# Patient Record
Sex: MALE | Race: WHITE | NOT HISPANIC OR LATINO | Employment: OTHER | URBAN - METROPOLITAN AREA
[De-identification: names, ages, dates, MRNs, and addresses within clinical notes are randomized per-mention and may not be internally consistent; named-entity substitution may affect disease eponyms.]

---

## 2017-03-28 DIAGNOSIS — E78.2 MIXED HYPERLIPIDEMIA: ICD-10-CM

## 2017-03-28 DIAGNOSIS — I25.10 CORONARY ARTERY STENOSIS: ICD-10-CM

## 2017-03-28 DIAGNOSIS — I21.4 NON-ST ELEVATION MYOCARDIAL INFARCTION (NSTEMI) (HCC): ICD-10-CM

## 2017-03-28 DIAGNOSIS — I25.10 CORONARY ARTERY DISEASE INVOLVING NATIVE CORONARY ARTERY OF NATIVE HEART WITHOUT ANGINA PECTORIS: ICD-10-CM

## 2017-03-28 DIAGNOSIS — N40.0 BENIGN PROSTATIC HYPERPLASIA WITHOUT LOWER URINARY TRACT SYMPTOMS, UNSPECIFIED MORPHOLOGY: ICD-10-CM

## 2017-03-28 RX ORDER — ATORVASTATIN CALCIUM 80 MG/1
TABLET, FILM COATED ORAL
Qty: 90 TAB | Refills: 0 | Status: SHIPPED | OUTPATIENT
Start: 2017-03-28 | End: 2017-06-27 | Stop reason: SDUPTHER

## 2017-03-28 RX ORDER — TAMSULOSIN HYDROCHLORIDE 0.4 MG/1
0.8 CAPSULE ORAL DAILY
Qty: 180 CAP | Refills: 0 | Status: SHIPPED | OUTPATIENT
Start: 2017-03-28 | End: 2017-06-27 | Stop reason: SDUPTHER

## 2017-06-27 ENCOUNTER — HOSPITAL ENCOUNTER (OUTPATIENT)
Dept: LAB | Facility: MEDICAL CENTER | Age: 67
End: 2017-06-27
Attending: FAMILY MEDICINE
Payer: MEDICARE

## 2017-06-27 ENCOUNTER — OFFICE VISIT (OUTPATIENT)
Dept: MEDICAL GROUP | Facility: PHYSICIAN GROUP | Age: 67
End: 2017-06-27
Payer: MEDICARE

## 2017-06-27 VITALS
HEIGHT: 70 IN | RESPIRATION RATE: 14 BRPM | OXYGEN SATURATION: 96 % | TEMPERATURE: 97.5 F | BODY MASS INDEX: 28.06 KG/M2 | WEIGHT: 196 LBS | SYSTOLIC BLOOD PRESSURE: 130 MMHG | HEART RATE: 74 BPM | DIASTOLIC BLOOD PRESSURE: 78 MMHG

## 2017-06-27 DIAGNOSIS — I21.4 NON-ST ELEVATION MYOCARDIAL INFARCTION (NSTEMI) (HCC): ICD-10-CM

## 2017-06-27 DIAGNOSIS — N40.1 BENIGN NON-NODULAR PROSTATIC HYPERPLASIA WITH LOWER URINARY TRACT SYMPTOMS: ICD-10-CM

## 2017-06-27 DIAGNOSIS — E78.2 MIXED HYPERLIPIDEMIA: ICD-10-CM

## 2017-06-27 DIAGNOSIS — I10 ESSENTIAL HYPERTENSION: ICD-10-CM

## 2017-06-27 DIAGNOSIS — Z23 NEED FOR TDAP VACCINATION: ICD-10-CM

## 2017-06-27 DIAGNOSIS — E55.9 VITAMIN D DEFICIENCY DISEASE: ICD-10-CM

## 2017-06-27 DIAGNOSIS — N40.0 BENIGN PROSTATIC HYPERPLASIA WITHOUT LOWER URINARY TRACT SYMPTOMS, UNSPECIFIED MORPHOLOGY: ICD-10-CM

## 2017-06-27 DIAGNOSIS — I25.10 CORONARY ARTERY STENOSIS: ICD-10-CM

## 2017-06-27 DIAGNOSIS — Z12.5 PROSTATE CANCER SCREENING: ICD-10-CM

## 2017-06-27 DIAGNOSIS — Z00.00 HEALTH CARE MAINTENANCE: ICD-10-CM

## 2017-06-27 DIAGNOSIS — N52.9 ERECTILE DYSFUNCTION, UNSPECIFIED ERECTILE DYSFUNCTION TYPE: ICD-10-CM

## 2017-06-27 DIAGNOSIS — I25.10 CORONARY ARTERY DISEASE INVOLVING NATIVE CORONARY ARTERY OF NATIVE HEART WITHOUT ANGINA PECTORIS: ICD-10-CM

## 2017-06-27 LAB
25(OH)D3 SERPL-MCNC: 27 NG/ML (ref 30–100)
ALBUMIN SERPL BCP-MCNC: 4.4 G/DL (ref 3.2–4.9)
ALBUMIN/GLOB SERPL: 1.5 G/DL
ALP SERPL-CCNC: 62 U/L (ref 30–99)
ALT SERPL-CCNC: 64 U/L (ref 2–50)
ANION GAP SERPL CALC-SCNC: 8 MMOL/L (ref 0–11.9)
APPEARANCE UR: CLEAR
AST SERPL-CCNC: 45 U/L (ref 12–45)
BASOPHILS # BLD AUTO: 0.6 % (ref 0–1.8)
BASOPHILS # BLD: 0.03 K/UL (ref 0–0.12)
BILIRUB SERPL-MCNC: 2.8 MG/DL (ref 0.1–1.5)
BILIRUB UR QL STRIP.AUTO: NEGATIVE
BUN SERPL-MCNC: 19 MG/DL (ref 8–22)
CALCIUM SERPL-MCNC: 9.9 MG/DL (ref 8.5–10.5)
CHLORIDE SERPL-SCNC: 107 MMOL/L (ref 96–112)
CHOLEST SERPL-MCNC: 139 MG/DL (ref 100–199)
CO2 SERPL-SCNC: 26 MMOL/L (ref 20–33)
COLOR UR: YELLOW
CREAT SERPL-MCNC: 0.9 MG/DL (ref 0.5–1.4)
CULTURE IF INDICATED INDCX: NO UA CULTURE
EOSINOPHIL # BLD AUTO: 0.15 K/UL (ref 0–0.51)
EOSINOPHIL NFR BLD: 3.1 % (ref 0–6.9)
ERYTHROCYTE [DISTWIDTH] IN BLOOD BY AUTOMATED COUNT: 42.2 FL (ref 35.9–50)
GFR SERPL CREATININE-BSD FRML MDRD: >60 ML/MIN/1.73 M 2
GLOBULIN SER CALC-MCNC: 2.9 G/DL (ref 1.9–3.5)
GLUCOSE SERPL-MCNC: 88 MG/DL (ref 65–99)
GLUCOSE UR STRIP.AUTO-MCNC: NEGATIVE MG/DL
HCT VFR BLD AUTO: 48.1 % (ref 42–52)
HDLC SERPL-MCNC: 71 MG/DL
HGB BLD-MCNC: 16.2 G/DL (ref 14–18)
IMM GRANULOCYTES # BLD AUTO: 0.02 K/UL (ref 0–0.11)
IMM GRANULOCYTES NFR BLD AUTO: 0.4 % (ref 0–0.9)
KETONES UR STRIP.AUTO-MCNC: NEGATIVE MG/DL
LDLC SERPL CALC-MCNC: 55 MG/DL
LEUKOCYTE ESTERASE UR QL STRIP.AUTO: NEGATIVE
LYMPHOCYTES # BLD AUTO: 1.37 K/UL (ref 1–4.8)
LYMPHOCYTES NFR BLD: 28.6 % (ref 22–41)
MCH RBC QN AUTO: 30.4 PG (ref 27–33)
MCHC RBC AUTO-ENTMCNC: 33.7 G/DL (ref 33.7–35.3)
MCV RBC AUTO: 90.2 FL (ref 81.4–97.8)
MICRO URNS: NORMAL
MONOCYTES # BLD AUTO: 0.42 K/UL (ref 0–0.85)
MONOCYTES NFR BLD AUTO: 8.8 % (ref 0–13.4)
NEUTROPHILS # BLD AUTO: 2.8 K/UL (ref 1.82–7.42)
NEUTROPHILS NFR BLD: 58.5 % (ref 44–72)
NITRITE UR QL STRIP.AUTO: NEGATIVE
NRBC # BLD AUTO: 0 K/UL
NRBC BLD AUTO-RTO: 0 /100 WBC
PH UR STRIP.AUTO: 6 [PH]
PLATELET # BLD AUTO: 175 K/UL (ref 164–446)
PMV BLD AUTO: 12.3 FL (ref 9–12.9)
POTASSIUM SERPL-SCNC: 4 MMOL/L (ref 3.6–5.5)
PROT SERPL-MCNC: 7.3 G/DL (ref 6–8.2)
PROT UR QL STRIP: NEGATIVE MG/DL
PSA SERPL-MCNC: 3.36 NG/ML (ref 0–4)
RBC # BLD AUTO: 5.33 M/UL (ref 4.7–6.1)
RBC UR QL AUTO: NEGATIVE
SODIUM SERPL-SCNC: 141 MMOL/L (ref 135–145)
SP GR UR STRIP.AUTO: 1.02
TRIGL SERPL-MCNC: 66 MG/DL (ref 0–149)
WBC # BLD AUTO: 4.8 K/UL (ref 4.8–10.8)

## 2017-06-27 PROCEDURE — 85025 COMPLETE CBC W/AUTO DIFF WBC: CPT

## 2017-06-27 PROCEDURE — 84153 ASSAY OF PSA TOTAL: CPT

## 2017-06-27 PROCEDURE — 81003 URINALYSIS AUTO W/O SCOPE: CPT

## 2017-06-27 PROCEDURE — 36415 COLL VENOUS BLD VENIPUNCTURE: CPT

## 2017-06-27 PROCEDURE — 80061 LIPID PANEL: CPT

## 2017-06-27 PROCEDURE — 99215 OFFICE O/P EST HI 40 MIN: CPT | Performed by: FAMILY MEDICINE

## 2017-06-27 PROCEDURE — 80053 COMPREHEN METABOLIC PANEL: CPT

## 2017-06-27 PROCEDURE — 82306 VITAMIN D 25 HYDROXY: CPT

## 2017-06-27 RX ORDER — TAMSULOSIN HYDROCHLORIDE 0.4 MG/1
0.8 CAPSULE ORAL DAILY
Qty: 180 CAP | Refills: 3 | Status: SHIPPED | OUTPATIENT
Start: 2017-06-27 | End: 2018-06-27 | Stop reason: SDUPTHER

## 2017-06-27 RX ORDER — ATORVASTATIN CALCIUM 80 MG/1
TABLET, FILM COATED ORAL
Qty: 90 TAB | Refills: 3 | Status: SHIPPED | OUTPATIENT
Start: 2017-06-27 | End: 2018-06-27 | Stop reason: SDUPTHER

## 2017-06-27 RX ORDER — SILDENAFIL 100 MG/1
100 TABLET, FILM COATED ORAL PRN
Qty: 16 TAB | Refills: 4 | Status: SHIPPED | OUTPATIENT
Start: 2017-06-27 | End: 2018-06-27 | Stop reason: SDUPTHER

## 2017-06-27 RX ORDER — CLOPIDOGREL BISULFATE 75 MG/1
75 TABLET ORAL
Qty: 90 TAB | Refills: 3 | Status: SHIPPED | OUTPATIENT
Start: 2017-06-27 | End: 2017-08-11

## 2017-06-27 ASSESSMENT — PATIENT HEALTH QUESTIONNAIRE - PHQ9: CLINICAL INTERPRETATION OF PHQ2 SCORE: 0

## 2017-06-27 NOTE — PROGRESS NOTES
Patient comes in with several issues. He is fasting for lab work. He feels well.  He has no chest pains and no shortness of breath. He has have a past history of an MI years ago and I would like to have the cardiologist just check to make sure things are okay.  He is due for colonoscopy but prefers a stoolFIT.  He is due for Tdap.  He needs refills of some of his medications.      Review of Systems   Constitutional: Negative.  Negative for fever, chills, weight loss and malaise/fatigue.   HENT: Negative for hearing loss, ear pain, congestion, sore throat, neck pain and tinnitus.    Eyes: Negative for blurred vision, double vision and pain.   Respiratory: Negative for cough, hemoptysis, shortness of breath and wheezing.    Cardiovascular: Negative for chest pain, palpitations, orthopnea, claudication, leg swelling and PND.   Gastrointestinal: Negative for heartburn, nausea, vomiting, abdominal pain, diarrhea, constipation, blood in stool and melena.   Genitourinary: Negative for incontinence, dysuria, urgency, frequency and hematuria.  Male--positive for erectile dysfunction  Musculoskeletal: Negative for myalgias, back pain and joint pain.   Skin: Negative for rash and itching. No lesions that will not heal.  Neurological: Negative for dizziness, tingling, tremors, focal weakness, seizures, loss of consciousness and headaches.   Endo/Heme/Allergies: Negative for environmental allergies and polydipsia.  Does not bruise/bleed easily.   Psychiatric/Behavioral: Negative for depression, memory loss and substance abuse.  The patient is not nervous/anxious and does not have insomnia.  All others negative.     I reviewed the following    Past Medical History   Diagnosis Date   • Hypertension    • Erectile dysfunction    • Hypertrophy of prostate without urinary obstruction and other lower urinary tract symptoms (LUTS)    • Benign prostatic hypertrophy    • Hyperlipidemia    • Coronary artery stenosis    • Non-ST elevation  myocardial infarction (NSTEMI) (CMS-[facility])         Past Surgical History   Procedure Laterality Date   • Other cardiac surgery  CATH STENT PLACEMENT     Dr Harris  11/2010       Allergies   Allergen Reactions   • Bloodless        Current Outpatient Prescriptions   Medication Sig Dispense Refill   • atorvastatin (LIPITOR) 80 MG tablet TAKE 1 TABLET BY MOUTH AT BEDTIME 90 Tab 3   • tamsulosin (FLOMAX) 0.4 MG capsule Take 2 Caps by mouth every day. 180 Cap 3   • metoprolol (LOPRESSOR) 25 MG Tab Take 1 Tab by mouth every day. 90 Tab 3   • clopidogrel (PLAVIX) 75 MG Tab Take 1 Tab by mouth every day. 90 Tab 3   • aspirin 81 MG tablet Take 1 Tab by mouth every day. 90 Tab 3   • sildenafil citrate (VIAGRA) 100 MG tablet Take 1 Tab by mouth as needed for Erectile Dysfunction. 16 Tab 4   • tetanus-dipth-acell pertussis (ADACEL) 5-2-15.5 LF-MCG/0.5 Suspension 0.5 mL by Intramuscular route Once PRN (Give 1 Vial Tdap IM  Z23). 1 Vial 0   • therapeutic multivitamin-minerals (THERAGRAN-M) TABS Take 1 Tab by mouth every day.     • Cholecalciferol (VITAMIN D3) 1000 UNIT TABS Take 2 Tabs by mouth every day. 100 Tab 3   • Ascorbic Acid (VITAMIN C PO) Take  by mouth.         No current facility-administered medications for this visit.        Family History   Problem Relation Age of Onset   • Hypertension Mother    • Diabetes Father    • Heart Disease Father        Social History     Social History   • Marital Status:      Spouse Name: N/A   • Number of Children: N/A   • Years of Education: N/A     Occupational History   • Not on file.     Social History Main Topics   • Smoking status: Never Smoker    • Smokeless tobacco: Never Used   • Alcohol Use: Yes      Comment: moderate   • Drug Use: No   • Sexual Activity:     Partners: Female     Birth Control/ Protection: Post-Menopausal     Other Topics Concern   • Not on file     Social History Narrative            Physical Exam   Nursing note and vitals reviewed.  Constitutional: He is  oriented. He appears well-developed and well-nourished. He appears not diaphoretic. No distress.   Head: Normocephalic and atraumatic.   Right Ear: External ear normal. Ear canal and TM normal  Left Ear: External ear normal. Ear canal and TM normal  Nose: Nose normal.   Mouth/Throat: Oropharynx is clear and moist. No oropharyngeal exudate.   Eyes: Conjunctivae and extraocular motions are normal. Pupils are equal, round, and reactive to light. Right eye exhibits no discharge. Left eye exhibits no discharge. No scleral icterus. Fundi benign bilaterally   Neck: Normal range of motion. Neck supple. No JVD present. No tracheal deviation present. No thyromegaly present.   Cardiovascular: Normal rate, regular rhythm, normal heart sounds and intact distal pulses.  Exam reveals no gallop and no friction rub.    No murmur heard.  Pulmonary/Chest: Effort normal and breath sounds normal. No stridor. No respiratory distress. He has no wheezes. He has no rales. He exhibits no tenderness.   Abdominal: Soft. Bowel sounds are normal. He exhibits no distension and no mass. No tenderness. No hepatosplenomegaly. He has no rebound and no guarding. Hernia confirmed negative in the right inguinal area and confirmed negative in the left inguinal area.   Genitourinary: Penis normal. Rectal exam shows no external hemorrhoid, no internal hemorrhoid, no fissure, no mass, no tenderness and anal tone normal. Guaiac negative stool. Prostate is 1+ enlarged and not tender. There are no prostate masses. Right testis shows no mass, no swelling, no tenderness. Right testis is descended. Left testis shows no mass, no swelling, no tenderness. Left testis is descended. Circumcised. No phimosis, penile erythema or penile tenderness. No discharge found.   Musculoskeletal: Normal range of motion. He exhibits no edema and no tenderness.   Lymphadenopathy:     He has no cervical adenopathy.   No supraclavicular adenopathy.       Right: No inguinal adenopathy  present.        Left: No inguinal adenopathy present.   Neurological: He is alert and oriented. He displays normal reflexes. No cranial nerve deficit. He exhibits normal muscle tone. Coordination normal.   Skin: Skin is warm and dry. No rash noted. He is not diaphoretic. No erythema. No pallor.   Psychiatric: He has a normal mood and appropriate affect. His behavior is normal. Judgment and thought content normal.      1. Mixed hyperlipidemia --each reassessment  atorvastatin (LIPITOR) 80 MG tablet    aspirin 81 MG tablet    COMP METABOLIC PANEL    LIPID PROFILE    VITAMIN D 25-HYDROXY   2. Coronary artery stenosis--asymptomatic  atorvastatin (LIPITOR) 80 MG tablet    clopidogrel (PLAVIX) 75 MG Tab    aspirin 81 MG tablet    REFERRAL TO CARDIOLOGY--[facility]    3. Non-ST elevation myocardial infarction (NSTEMI) (CMS-[facility]) --past history  atorvastatin (LIPITOR) 80 MG tablet    clopidogrel (PLAVIX) 75 MG Tab    REFERRAL TO CARDIOLOGY--[facility]    4. Vitamin D deficiency disease  VITAMIN D 25-HYDROXY   5. Benign non-nodular prostatic hyperplasia with lower urinary tract symptoms   stable    6. Erectile dysfunction, unspecified erectile dysfunction type  sildenafil citrate (VIAGRA) 100 MG tablet   7. Coronary artery disease involving native coronary artery of native heart without angina pectoris  atorvastatin (LIPITOR) 80 MG tablet    metoprolol (LOPRESSOR) 25 MG Tab    clopidogrel (PLAVIX) 75 MG Tab    REFERRAL TO CARDIOLOGY--[facility]    8. Benign prostatic hyperplasia without lower urinary tract symptoms, unspecified morphology  tamsulosin (FLOMAX) 0.4 MG capsule   9. Essential hypertension  metoprolol (LOPRESSOR) 25 MG Tab    CBC WITH DIFFERENTIAL    COMP METABOLIC PANEL    LIPID PROFILE    URINALYSIS,CULTURE IF INDICATED   10. Prostate cancer screening--controlled  PROSTATE SPECIFIC AG SCREENING   11. Health care maintenance  OCCULT BLOOD FECES IMMUNOASSAY   12. Need for Tdap vaccination  tetanus-dipth-acell pertussis (ADACEL)  5-2-15.5 LF-MCG/0.5 Suspension--given prescription to have this done in an outside pharmacy      I spent 50 minutes with this patient of which 29 minutes was involved in answering his questions about hyperlipidemia, order artery disease history, vitamin D deficiency, BPH, erectile dysfunction, controlled hypertension, colon cancer screening, and Tdap.    Recheck one year or when necessary    Please note that this dictation was created using voice recognition software. I have worked with consultants from the vendor as well as technical experts from [employer] to optimize the interface. I have made every reasonable attempt to correct obvious errors, but I expect that there are errors of grammar and possibly content that I did not discover before finalizing the note.

## 2017-06-27 NOTE — MR AVS SNAPSHOT
"        Jose L Lin   2017 10:20 AM   Office Visit   MRN: [id number]    Department:  [facility]   Dept Phone:  [phone]    Description:  Male : 1950   Provider:  Callum Cruz M.D.           Reason for Visit     Annual Exam           Allergies as of 2017     Allergen Noted Reactions    Bloodless 2010         You were diagnosed with     Mixed hyperlipidemia   [272.2.ICD-9-CM]       Coronary artery stenosis   [764237]       Non-ST elevation myocardial infarction (NSTEMI) (CMS-HCC)   [842481]       Vitamin D deficiency disease   [787880]       Benign non-nodular prostatic hyperplasia with lower urinary tract symptoms   [6242929]       Erectile dysfunction, unspecified erectile dysfunction type   [3550287]       Coronary artery disease involving native coronary artery of native heart without angina pectoris   [9426527]       Benign prostatic hyperplasia without lower urinary tract symptoms, unspecified morphology   [1048586]       Essential hypertension   [2484779]       Prostate cancer screening   [218114]       Health care maintenance   [704434]       Need for Tdap vaccination   [672183]         Vital Signs     Blood Pressure Pulse Temperature Respirations Height Weight    130/78 mmHg 74 36.4 °C (97.5 °F) 14 1.765 m (5' 9.5\") 88.905 kg (196 lb)    Body Mass Index Oxygen Saturation Smoking Status             28.54 kg/m2 96% Never Smoker          Basic Information     Date Of Birth Sex Race Ethnicity Preferred Language    1950 Male White Non- English      Your appointments     Mar 06, 2018 10:40 AM   ANNUAL EXAM PREVENTATIVE with Callum Cruz M.D.   [facility] [address] [address]   [phone]              Problem List              ICD-10-CM Priority Class Noted - Resolved    Hypertension I10   2009 - Present    ED (erectile dysfunction) N52.9   2009 - Present    Decreased hearing H91.90   2010 " - Present    Vitamin D deficiency disease E55.9   4/5/2011 - Present    Hyperlipidemia E78.5   Unknown - Present    Coronary artery stenosis I25.10   Unknown - Present    Non-ST elevation myocardial infarction (NSTEMI) (CMS-HCC) I21.4   Unknown - Present    Benign non-nodular prostatic hyperplasia with lower urinary tract symptoms N40.1   6/27/2017 - Present      Health Maintenance        Date Due Completion Dates    IMM DTaP/Tdap/Td Vaccine (1 - Tdap) 11/6/1969 ---    COLONOSCOPY 11/6/2000 ---    IMM ZOSTER VACCINE 11/6/2010 ---    IMM PNEUMOCOCCAL 65+ (ADULT) LOW/MEDIUM RISK SERIES (1 of 2 - PCV13) 11/6/2015 ---            Current Immunizations     No immunizations on file.      Below and/or attached are the medications your provider expects you to take. Review all of your home medications and newly ordered medications with your provider and/or pharmacist. Follow medication instructions as directed by your provider and/or pharmacist. Please keep your medication list with you and share with your provider. Update the information when medications are discontinued, doses are changed, or new medications (including over-the-counter products) are added; and carry medication information at all times in the event of emergency situations     Allergies:  BLOODLESS - (reactions not documented)               Medications  Valid as of: June 27, 2017 - 11:04 AM    Generic Name Brand Name Tablet Size Instructions for use    Ascorbic Acid   Take  by mouth.          Aspirin (Tab) aspirin 81 MG Take 1 Tab by mouth every day.        Atorvastatin Calcium (Tab) LIPITOR 80 MG TAKE 1 TABLET BY MOUTH AT BEDTIME        Cholecalciferol (Tab) vitamin D3 (cholecalciferol) 1000 UNIT Take 2 Tabs by mouth every day.        Clopidogrel Bisulfate (Tab) PLAVIX 75 MG Take 1 Tab by mouth every day.        Metoprolol Tartrate (Tab) LOPRESSOR 25 MG Take 1 Tab by mouth every day.        Multiple Vitamins-Minerals (Tab) THERAGRAN-M  Take 1 Tab by mouth  every day.        Sildenafil Citrate (Tab) VIAGRA 100 MG Take 1 Tab by mouth as needed for Erectile Dysfunction.        Tamsulosin HCl (Cap) FLOMAX 0.4 MG Take 2 Caps by mouth every day.        Tetanus-Diphth-Acell Pertussis (Suspension) ADACEL 5-2-15.5 LF-MCG/0.5 0.5 mL by Intramuscular route Once PRN (Give 1 Vial Tdap IM  Z23).        .                 Medicines prescribed today were sent to:     [id number] - [place], [place] - [address]    [address] [place] [address]    Phone: [phone] Fax: [phone]    Open 24 Hours?: No    [facility] [place] - [address] AT [address] TO [facility]    [address] [facility] [address]    Phone: [phone] Fax: [phone]    Open 24 Hours?: No      Medication refill instructions:       If your prescription bottle indicates you have medication refills left, it is not necessary to call your provider’s office. Please contact your pharmacy and they will refill your medication.    If your prescription bottle indicates you do not have any refills left, you may request refills at any time through one of the following ways: The online [employer] system (except Urgent Care), by calling your provider’s office, or by asking your pharmacy to contact your provider’s office with a refill request. Medication refills are processed only during regular business hours and may not be available until the next business day. Your provider may request additional information or to have a follow-up visit with you prior to refilling your medication.   *Please Note: Medication refills are assigned a new Rx number when refilled electronically. Your pharmacy may indicate that no refills were authorized even though a new prescription for the same medication is available at the pharmacy. Please request the medicine by name with the pharmacy before contacting your provider for a refill.        Your To Do List     Future Labs/Procedures  Complete By Expires    CBC WITH DIFFERENTIAL  As directed 6/28/2018    COMP METABOLIC PANEL  As directed 6/28/2018    LIPID PROFILE  As directed 6/28/2018    OCCULT BLOOD FECES IMMUNOASSAY  As directed 6/28/2018    PROSTATE SPECIFIC AG SCREENING  As directed 6/28/2018    URINALYSIS,CULTURE IF INDICATED  As directed 6/28/2018      Referral     A referral request has been sent to our patient care coordination department. Please allow 3-5 business days for us to process this request and contact you either by phone or mail. If you do not hear from us by the 5th business day, please call us at [phone].           [employer] Access Code: Activation code not generated  Current Nettle Status: Active

## 2017-06-27 NOTE — PATIENT INSTRUCTIONS
Patient given written instructions regarding labs, imaging, medications, referrals, dietary and lifestyle management, and return visit.    Callum Cruz MD

## 2017-08-11 ENCOUNTER — OFFICE VISIT (OUTPATIENT)
Dept: CARDIOLOGY | Facility: MEDICAL CENTER | Age: 67
End: 2017-08-11
Payer: MEDICARE

## 2017-08-11 VITALS
WEIGHT: 198 LBS | BODY MASS INDEX: 28.35 KG/M2 | OXYGEN SATURATION: 94 % | SYSTOLIC BLOOD PRESSURE: 130 MMHG | HEIGHT: 70 IN | HEART RATE: 66 BPM | DIASTOLIC BLOOD PRESSURE: 80 MMHG

## 2017-08-11 DIAGNOSIS — R01.1 SYSTOLIC MURMUR: ICD-10-CM

## 2017-08-11 DIAGNOSIS — I10 ESSENTIAL HYPERTENSION: ICD-10-CM

## 2017-08-11 DIAGNOSIS — I25.119 CORONARY ARTERY DISEASE WITH ANGINA PECTORIS, UNSPECIFIED VESSEL OR LESION TYPE, UNSPECIFIED WHETHER NATIVE OR TRANSPLANTED HEART (HCC): ICD-10-CM

## 2017-08-11 DIAGNOSIS — I25.10 CORONARY ARTERY DISEASE INVOLVING NATIVE CORONARY ARTERY OF NATIVE HEART WITHOUT ANGINA PECTORIS: ICD-10-CM

## 2017-08-11 PROCEDURE — 99204 OFFICE O/P NEW MOD 45 MIN: CPT | Performed by: INTERNAL MEDICINE

## 2017-08-11 RX ORDER — NITROGLYCERIN 0.4 MG/1
0.4 TABLET SUBLINGUAL PRN
Qty: 25 TAB | Refills: 11 | Status: SHIPPED | OUTPATIENT
Start: 2017-08-11 | End: 2018-06-27 | Stop reason: SDUPTHER

## 2017-08-11 ASSESSMENT — ENCOUNTER SYMPTOMS
ABDOMINAL PAIN: 0
DEPRESSION: 0
DIZZINESS: 0
BRUISES/BLEEDS EASILY: 0
SHORTNESS OF BREATH: 0
ORTHOPNEA: 0
PND: 0
PALPITATIONS: 0
LOSS OF CONSCIOUSNESS: 0
FALLS: 0

## 2017-08-11 NOTE — Clinical Note
[facility]   [address] [place] [address]  [place] [address]  Phone: [phone]  Fax: [phone]              Jose L Lin  1950    Encounter Date: 8/11/2017    Lynda Malagon M.D.          PROGRESS NOTE:  Subjective:   Jose L Lin is a 66 y.o. male with past history significant for hypertension, hyperlipidemia, coronary artery disease status post-PCI to the circumflex in 2010 who was referred here for further management. Patient has been doing well overall since his PCI. He only reports one episode of chest discomfort while he was lifting heavy objects as he was remodeling his house. He denies any dyspnea. No palpitations or dizziness. He walks 3 times a week for about 2 miles in about 30 minutes without any symptoms.    He has been compliant with all of his medications. He is only on metoprolol 25 mg once a day as has been prescribed. He is on both aspirin and Plavix and has has not had any bleeding issues.   He is compliant with his Lipitor. His LDL has been well controlled over the years.    He drinks 2-3 drinks per day, red wine or vodka.    His last echocardiogram was in 2010, unfortunately I cannot find that report.  During the cardiac catheterization he was found to have an occluded left circumflex. He did not have any other significant coronary artery disease.    Past Medical History   Diagnosis Date   • Hypertension    • Erectile dysfunction    • Hypertrophy of prostate without urinary obstruction and other lower urinary tract symptoms (LUTS)    • Benign prostatic hypertrophy    • Hyperlipidemia    • Coronary artery stenosis    • Non-ST elevation myocardial infarction (NSTEMI) (CMS-[facility])      Past Surgical History   Procedure Laterality Date   • Other cardiac surgery  CATH STENT PLACEMENT     Dr Harris  11/2010     Family History   Problem Relation Age of Onset   • Hypertension Mother    • Diabetes Father    • Heart Disease Father      History   Smoking  status   • Never Smoker    Smokeless tobacco   • Never Used     Drinks 2-3 drinks per day. No recreational drug use.    Allergies   Allergen Reactions   • Bloodless      Outpatient Encounter Prescriptions as of 8/11/2017   Medication Sig Dispense Refill   • metoprolol (LOPRESSOR) 25 MG Tab Take 1 Tab by mouth 2 times a day. 180 Tab 3   • nitroglycerin (NITROSTAT) 0.4 MG SL Tab Place 1 Tab under tongue as needed for Chest Pain. 25 Tab 11   • atorvastatin (LIPITOR) 80 MG tablet TAKE 1 TABLET BY MOUTH AT BEDTIME 90 Tab 3   • tamsulosin (FLOMAX) 0.4 MG capsule Take 2 Caps by mouth every day. 180 Cap 3   • aspirin 81 MG tablet Take 1 Tab by mouth every day. 90 Tab 3   • therapeutic multivitamin-minerals (THERAGRAN-M) TABS Take 1 Tab by mouth every day.     • Cholecalciferol (VITAMIN D3) 1000 UNIT TABS Take 2 Tabs by mouth every day. 100 Tab 3   • Ascorbic Acid (VITAMIN C PO) Take  by mouth.       • sildenafil citrate (VIAGRA) 100 MG tablet Take 1 Tab by mouth as needed for Erectile Dysfunction. 16 Tab 4   • tetanus-dipth-acell pertussis (ADACEL) 5-2-15.5 LF-MCG/0.5 Suspension 0.5 mL by Intramuscular route Once PRN (Give 1 Vial Tdap IM  Z23). 1 Vial 0   • [DISCONTINUED] metoprolol (LOPRESSOR) 25 MG Tab Take 1 Tab by mouth every day. 90 Tab 3   • [DISCONTINUED] clopidogrel (PLAVIX) 75 MG Tab Take 1 Tab by mouth every day. 90 Tab 3     No facility-administered encounter medications on file as of 8/11/2017.     Review of Systems   Constitutional: Negative for malaise/fatigue.   HENT: Negative.    Respiratory: Negative for shortness of breath.    Cardiovascular: Negative for chest pain, palpitations, orthopnea, leg swelling and PND.   Gastrointestinal: Negative for abdominal pain.   Musculoskeletal: Negative for falls.   Skin: Negative.    Neurological: Negative for dizziness and loss of consciousness.   Endo/Heme/Allergies: Does not bruise/bleed easily.   Psychiatric/Behavioral: Negative for depression.   All other systems  "reviewed and are negative.       Objective:   /80 mmHg  Pulse 66  Ht 1.765 m (5' 9.5\")  Wt 89.812 kg (198 lb)  BMI 28.83 kg/m2  SpO2 94%    Physical Exam   Constitutional: He is oriented to person, place, and time. No distress.   HENT:   Head: Normocephalic and atraumatic.   Eyes: Conjunctivae are normal.   Neck: Normal range of motion. Neck supple. No JVD present.   Cardiovascular: Normal rate and regular rhythm.  Exam reveals no gallop and no friction rub.    Murmur heard.   Crescendo decrescendo systolic murmur is present with a grade of 2/6   Pulmonary/Chest: Effort normal and breath sounds normal. No respiratory distress. He has no wheezes. He has no rales.   Abdominal: Soft. There is no tenderness.   Musculoskeletal: He exhibits no edema.   Neurological: He is alert and oriented to person, place, and time.   Skin: Skin is warm and dry. He is not diaphoretic.   Psychiatric: He has a normal mood and affect.   Nursing note and vitals reviewed.    Labs from June 2017 were reviewed. Normal hemoglobin. Normal creatinine. LDL 55.    ECG from today was reviewed and shows normal sinus rhythm at 61 bpm, voltage criteria for left ventricular hypertrophy, incomplete right bundle branch block.    Assessment:     1. Coronary artery disease with angina pectoris, unspecified vessel or lesion type, unspecified whether native or transplanted heart (CMS-[facility])  EKG    nitroglycerin (NITROSTAT) 0.4 MG SL Tab   2. Coronary artery disease involving native coronary artery of native heart without angina pectoris  metoprolol (LOPRESSOR) 25 MG Tab   3. Essential hypertension  metoprolol (LOPRESSOR) 25 MG Tab   4. Systolic murmur  ECHOCARDIOGRAM COMP W/O CONT       Medical Decision Making:  Today's Assessment / Status / Plan:     Coronary artery disease-patient's last PCI was about 7 years ago. Only one episode of angina many months ago while lifting heavy objects. Since then he has been asymptomatic. For now continue aspirin. " No clear indication for use of Plavix at this time as patient is 7 years out from his PCI. Will discontinue Plavix at this time. I expect to the patient that metoprolol 25 mg once a day is not sufficient and so I will increase the metoprolol to 25 mg twice a day. Continue Lipitor.  I advised the patient to continue aerobic exercise and increase the intensity and frequency of exercise as tolerated.  I have also prescribed him nitroglycerin prn. The patient knows to not take his PDE-5 inhibitors within 48 hours of the nitroglycerin.    Systolic murmur-likely aortic sclerosis versus mild stenosis. I will refer him for an echocardiogram for further evaluation.    Hypertension-blood pressure is at goal. Metoprolol changes noted above.    Hyperlipidemia-LDL 55. No clear indication for high dose of Lipitor, however patient would like to continue the current dose as he is worried about having a recurrent MI.    Alcohol use-I advised the patient to limit his alcoholic beverage intake to ideally one drink per day, no more than 2 per day.    Return to clinic in 6 months or earlier if needed.    Thank you for allowing me to participate in the care of this patient. Please do not hesitate to contact me with any questions.    Lynda Malagon MD  Cardiologist  [facility]      PLEASE NOTE: This dictation was created using voice recognition software.         Callum Cruz M.D.  [address] [place] [address]  VIA In Basket

## 2017-08-11 NOTE — MR AVS SNAPSHOT
"        Jose L Lin   2017 2:00 PM   Office Visit   MRN: [id number]    Department:  [facility]   Dept Phone:  [phone]    Description:  Male : 1950   Provider:  Lynda Malagon M.D.           Reason for Visit     New Patient           Allergies as of 2017     Allergen Noted Reactions    Bloodless 2010         You were diagnosed with     Coronary artery disease with angina pectoris, unspecified vessel or lesion type, unspecified whether native or transplanted heart (CMS-[facility])   [0437512]       Coronary artery disease involving native coronary artery of native heart without angina pectoris   [2431945]       Essential hypertension   [6610284]       Systolic murmur   [846962]         Vital Signs     Blood Pressure Pulse Height Weight Body Mass Index Oxygen Saturation    130/80 mmHg 66 1.765 m (5' 9.5\") 89.812 kg (198 lb) 28.83 kg/m2 94%    Smoking Status                   Never Smoker            Basic Information     Date Of Birth Sex Race Ethnicity Preferred Language    1950 Male White Non- English      Your appointments     Aug 25, 2017  2:15 PM   ECHO with ECHO [facility] EXAM 9   ECHOCARDIOLOGY [facility] ([facility])    [address] [place] [address]   [phone]            2018  2:00 PM   FOLLOW UP with Lynda Malagon M.D.   [facility] (--)    [address], [facility] [facility]  [place] [address]   [phone]            Mar 06, 2018 10:40 AM   ANNUAL EXAM PREVENTATIVE with Callum Cruz M.D.   [facility] ([place])    [address] [address] [phone]              Problem List              ICD-10-CM Priority Class Noted - Resolved    Hypertension I10   2009 - Present    ED (erectile dysfunction) N52.9   2009 - Present    Decreased hearing H91.90   2010 - Present    Vitamin D deficiency disease E55.9   2011 - Present    Hyperlipidemia E78.5   Unknown - Present    Coronary artery " stenosis I25.10   Unknown - Present    Non-ST elevation myocardial infarction (NSTEMI) (CMS-HCC) I21.4   Unknown - Present    Benign non-nodular prostatic hyperplasia with lower urinary tract symptoms N40.1   6/27/2017 - Present      Health Maintenance        Date Due Completion Dates    IMM DTaP/Tdap/Td Vaccine (1 - Tdap) 11/6/1969 ---    COLONOSCOPY 11/6/2000 ---    IMM ZOSTER VACCINE 11/6/2010 ---    IMM PNEUMOCOCCAL 65+ (ADULT) LOW/MEDIUM RISK SERIES (1 of 2 - PCV13) 11/6/2015 ---    IMM INFLUENZA (1) 9/1/2017 ---            Results       Current Immunizations     No immunizations on file.      Below and/or attached are the medications your provider expects you to take. Review all of your home medications and newly ordered medications with your provider and/or pharmacist. Follow medication instructions as directed by your provider and/or pharmacist. Please keep your medication list with you and share with your provider. Update the information when medications are discontinued, doses are changed, or new medications (including over-the-counter products) are added; and carry medication information at all times in the event of emergency situations     Allergies:  BLOODLESS - (reactions not documented)               Medications  Valid as of: August 11, 2017 -  2:35 PM    Generic Name Brand Name Tablet Size Instructions for use    Ascorbic Acid   Take  by mouth.          Aspirin (Tab) aspirin 81 MG Take 1 Tab by mouth every day.        Atorvastatin Calcium (Tab) LIPITOR 80 MG TAKE 1 TABLET BY MOUTH AT BEDTIME        Cholecalciferol (Tab) vitamin D3 (cholecalciferol) 1000 UNIT Take 2 Tabs by mouth every day.        Metoprolol Tartrate (Tab) LOPRESSOR 25 MG Take 1 Tab by mouth 2 times a day.        Multiple Vitamins-Minerals (Tab) THERAGRAN-M  Take 1 Tab by mouth every day.        Nitroglycerin (SL Tab) NITROSTAT 0.4 MG Place 1 Tab under tongue as needed for Chest Pain.        Sildenafil Citrate (Tab) VIAGRA 100 MG Take  1 Tab by mouth as needed for Erectile Dysfunction.        Tamsulosin HCl (Cap) FLOMAX 0.4 MG Take 2 Caps by mouth every day.        Tetanus-Diphth-Acell Pertussis (Suspension) ADACEL 5-2-15.5 LF-MCG/0.5 0.5 mL by Intramuscular route Once PRN (Give 1 Vial Tdap IM  Z23).        .                 Medicines prescribed today were sent to:     [id number] - [place], [place] - [address]    [address] [place] [address]    Phone: [phone] Fax: [phone]    Open 24 Hours?: No    [facility] - [place], [place] - [address] AT [address] TO [facility]    [address] [facility] [address]    Phone: [phone] Fax: [phone]    Open 24 Hours?: No      Medication refill instructions:       If your prescription bottle indicates you have medication refills left, it is not necessary to call your provider’s office. Please contact your pharmacy and they will refill your medication.    If your prescription bottle indicates you do not have any refills left, you may request refills at any time through one of the following ways: The online [employer] system (except Urgent Care), by calling your provider’s office, or by asking your pharmacy to contact your provider’s office with a refill request. Medication refills are processed only during regular business hours and may not be available until the next business day. Your provider may request additional information or to have a follow-up visit with you prior to refilling your medication.   *Please Note: Medication refills are assigned a new Rx number when refilled electronically. Your pharmacy may indicate that no refills were authorized even though a new prescription for the same medication is available at the pharmacy. Please request the medicine by name with the pharmacy before contacting your provider for a refill.        Your To Do List     Future Labs/Procedures Complete By Expires    ECHOCARDIOGRAM COMP W/O CONT  As directed  8/11/2018      Instructions    Please stop taking plavix  Please take metoprolol 25mg twice a day.           [employer] Access Code: Activation code not generated  Current [employer] Status: Active

## 2017-08-11 NOTE — PROGRESS NOTES
Subjective:   Jose L Lin is a 66 y.o. male with past history significant for hypertension, hyperlipidemia, coronary artery disease status post-PCI to the circumflex in 2010 who was referred here for further management. Patient has been doing well overall since his PCI. He only reports one episode of chest discomfort while he was lifting heavy objects as he was remodeling his house. He denies any dyspnea. No palpitations or dizziness. He walks 3 times a week for about 2 miles in about 30 minutes without any symptoms.    He has been compliant with all of his medications. He is only on metoprolol 25 mg once a day as has been prescribed. He is on both aspirin and Plavix and has has not had any bleeding issues.   He is compliant with his Lipitor. His LDL has been well controlled over the years.    He drinks 2-3 drinks per day, red wine or vodka.    His last echocardiogram was in 2010, unfortunately I cannot find that report.  During the cardiac catheterization he was found to have an occluded left circumflex. He did not have any other significant coronary artery disease.    Past Medical History   Diagnosis Date   • Hypertension    • Erectile dysfunction    • Hypertrophy of prostate without urinary obstruction and other lower urinary tract symptoms (LUTS)    • Benign prostatic hypertrophy    • Hyperlipidemia    • Coronary artery stenosis    • Non-ST elevation myocardial infarction (NSTEMI) (CMS-[facility])      Past Surgical History   Procedure Laterality Date   • Other cardiac surgery  CATH STENT PLACEMENT     Dr Harris  11/2010     Family History   Problem Relation Age of Onset   • Hypertension Mother    • Diabetes Father    • Heart Disease Father      History   Smoking status   • Never Smoker    Smokeless tobacco   • Never Used     Drinks 2-3 drinks per day. No recreational drug use.    Allergies   Allergen Reactions   • Bloodless      Outpatient Encounter Prescriptions as of 8/11/2017   Medication Sig Dispense Refill   •  "metoprolol (LOPRESSOR) 25 MG Tab Take 1 Tab by mouth 2 times a day. 180 Tab 3   • nitroglycerin (NITROSTAT) 0.4 MG SL Tab Place 1 Tab under tongue as needed for Chest Pain. 25 Tab 11   • atorvastatin (LIPITOR) 80 MG tablet TAKE 1 TABLET BY MOUTH AT BEDTIME 90 Tab 3   • tamsulosin (FLOMAX) 0.4 MG capsule Take 2 Caps by mouth every day. 180 Cap 3   • aspirin 81 MG tablet Take 1 Tab by mouth every day. 90 Tab 3   • therapeutic multivitamin-minerals (THERAGRAN-M) TABS Take 1 Tab by mouth every day.     • Cholecalciferol (VITAMIN D3) 1000 UNIT TABS Take 2 Tabs by mouth every day. 100 Tab 3   • Ascorbic Acid (VITAMIN C PO) Take  by mouth.       • sildenafil citrate (VIAGRA) 100 MG tablet Take 1 Tab by mouth as needed for Erectile Dysfunction. 16 Tab 4   • tetanus-dipth-acell pertussis (ADACEL) 5-2-15.5 LF-MCG/0.5 Suspension 0.5 mL by Intramuscular route Once PRN (Give 1 Vial Tdap IM  Z23). 1 Vial 0   • [DISCONTINUED] metoprolol (LOPRESSOR) 25 MG Tab Take 1 Tab by mouth every day. 90 Tab 3   • [DISCONTINUED] clopidogrel (PLAVIX) 75 MG Tab Take 1 Tab by mouth every day. 90 Tab 3     No facility-administered encounter medications on file as of 8/11/2017.     Review of Systems   Constitutional: Negative for malaise/fatigue.   HENT: Negative.    Respiratory: Negative for shortness of breath.    Cardiovascular: Negative for chest pain, palpitations, orthopnea, leg swelling and PND.   Gastrointestinal: Negative for abdominal pain.   Musculoskeletal: Negative for falls.   Skin: Negative.    Neurological: Negative for dizziness and loss of consciousness.   Endo/Heme/Allergies: Does not bruise/bleed easily.   Psychiatric/Behavioral: Negative for depression.   All other systems reviewed and are negative.       Objective:   /80 mmHg  Pulse 66  Ht 1.765 m (5' 9.5\")  Wt 89.812 kg (198 lb)  BMI 28.83 kg/m2  SpO2 94%    Physical Exam   Constitutional: He is oriented to person, place, and time. No distress.   HENT:   Head: " Normocephalic and atraumatic.   Eyes: Conjunctivae are normal.   Neck: Normal range of motion. Neck supple. No JVD present.   Cardiovascular: Normal rate and regular rhythm.  Exam reveals no gallop and no friction rub.    Murmur heard.   Crescendo decrescendo systolic murmur is present with a grade of 2/6   Pulmonary/Chest: Effort normal and breath sounds normal. No respiratory distress. He has no wheezes. He has no rales.   Abdominal: Soft. There is no tenderness.   Musculoskeletal: He exhibits no edema.   Neurological: He is alert and oriented to person, place, and time.   Skin: Skin is warm and dry. He is not diaphoretic.   Psychiatric: He has a normal mood and affect.   Nursing note and vitals reviewed.    Labs from June 2017 were reviewed. Normal hemoglobin. Normal creatinine. LDL 55.    ECG from today was reviewed and shows normal sinus rhythm at 61 bpm, voltage criteria for left ventricular hypertrophy, incomplete right bundle branch block.    Assessment:     1. Coronary artery disease with angina pectoris, unspecified vessel or lesion type, unspecified whether native or transplanted heart (CMS-[facility])  EKG    nitroglycerin (NITROSTAT) 0.4 MG SL Tab   2. Coronary artery disease involving native coronary artery of native heart without angina pectoris  metoprolol (LOPRESSOR) 25 MG Tab   3. Essential hypertension  metoprolol (LOPRESSOR) 25 MG Tab   4. Systolic murmur  ECHOCARDIOGRAM COMP W/O CONT       Medical Decision Making:  Today's Assessment / Status / Plan:     Coronary artery disease-patient's last PCI was about 7 years ago. Only one episode of angina many months ago while lifting heavy objects. Since then he has been asymptomatic. For now continue aspirin. No clear indication for use of Plavix at this time as patient is 7 years out from his PCI. Will discontinue Plavix at this time. I expect to the patient that metoprolol 25 mg once a day is not sufficient and so I will increase the metoprolol to 25 mg  twice a day. Continue Lipitor.  I advised the patient to continue aerobic exercise and increase the intensity and frequency of exercise as tolerated.  I have also prescribed him nitroglycerin prn. The patient knows to not take his PDE-5 inhibitors within 48 hours of the nitroglycerin.    Systolic murmur-likely aortic sclerosis versus mild stenosis. I will refer him for an echocardiogram for further evaluation.    Hypertension-blood pressure is at goal. Metoprolol changes noted above.    Hyperlipidemia-LDL 55. No clear indication for high dose of Lipitor, however patient would like to continue the current dose as he is worried about having a recurrent MI.    Alcohol use-I advised the patient to limit his alcoholic beverage intake to ideally one drink per day, no more than 2 per day.    Return to clinic in 6 months or earlier if needed.    Thank you for allowing me to participate in the care of this patient. Please do not hesitate to contact me with any questions.    Lynda Malagon MD  Cardiologist  [facility]      PLEASE NOTE: This dictation was created using voice recognition software.

## 2017-08-14 LAB — EKG IMPRESSION: NORMAL

## 2017-08-25 ENCOUNTER — APPOINTMENT (OUTPATIENT)
Dept: CARDIOLOGY | Facility: MEDICAL CENTER | Age: 67
End: 2017-08-25
Attending: INTERNAL MEDICINE
Payer: MEDICARE

## 2017-09-11 ENCOUNTER — HOSPITAL ENCOUNTER (OUTPATIENT)
Dept: CARDIOLOGY | Facility: MEDICAL CENTER | Age: 67
End: 2017-09-11
Attending: INTERNAL MEDICINE
Payer: MEDICARE

## 2017-09-11 DIAGNOSIS — R01.1 SYSTOLIC MURMUR: ICD-10-CM

## 2017-09-11 PROCEDURE — 93306 TTE W/DOPPLER COMPLETE: CPT

## 2017-09-11 PROCEDURE — 93306 TTE W/DOPPLER COMPLETE: CPT | Mod: 26 | Performed by: INTERNAL MEDICINE

## 2017-09-13 LAB
LV EJECT FRACT  99904: 55
LV EJECT FRACT MOD 2C 99903: 36.79
LV EJECT FRACT MOD 4C 99902: 45.96
LV EJECT FRACT MOD BP 99901: 42.78

## 2017-09-15 ENCOUNTER — TELEPHONE (OUTPATIENT)
Dept: CARDIOLOGY | Facility: MEDICAL CENTER | Age: 67
End: 2017-09-15

## 2017-09-15 NOTE — TELEPHONE ENCOUNTER
----- Message from Marlen Madsen R.N. sent at 9/15/2017  2:56 PM PDT -----      ----- Message -----  From: Lynda Malagon M.D.  Sent: 9/15/2017   2:44 PM  To: Melanie Camarena R.N.    Reviewed echocardiogram. Looks good. Murmur secondary to aortic sclerosis. Nothing to worry about.  No change in management at this time.   Thank you   AA

## 2017-09-18 NOTE — TELEPHONE ENCOUNTER
S/w pt about Echo results. He denies questions at this time and is appreciative of call and will FU with AA 1/30.

## 2018-03-06 ENCOUNTER — APPOINTMENT (OUTPATIENT)
Dept: MEDICAL GROUP | Facility: PHYSICIAN GROUP | Age: 68
End: 2018-03-06
Payer: MEDICARE

## 2018-06-27 ENCOUNTER — OFFICE VISIT (OUTPATIENT)
Dept: MEDICAL GROUP | Facility: PHYSICIAN GROUP | Age: 68
End: 2018-06-27
Payer: MEDICARE

## 2018-06-27 VITALS
SYSTOLIC BLOOD PRESSURE: 120 MMHG | BODY MASS INDEX: 28.2 KG/M2 | HEIGHT: 70 IN | DIASTOLIC BLOOD PRESSURE: 68 MMHG | OXYGEN SATURATION: 95 % | HEART RATE: 68 BPM | RESPIRATION RATE: 14 BRPM | WEIGHT: 197 LBS | TEMPERATURE: 97.5 F

## 2018-06-27 DIAGNOSIS — H91.93 DECREASED HEARING OF BOTH EARS: ICD-10-CM

## 2018-06-27 DIAGNOSIS — Z12.5 PROSTATE CANCER SCREENING: ICD-10-CM

## 2018-06-27 DIAGNOSIS — I25.119 CORONARY ARTERY DISEASE WITH ANGINA PECTORIS, UNSPECIFIED VESSEL OR LESION TYPE, UNSPECIFIED WHETHER NATIVE OR TRANSPLANTED HEART (HCC): ICD-10-CM

## 2018-06-27 DIAGNOSIS — N52.9 ERECTILE DYSFUNCTION, UNSPECIFIED ERECTILE DYSFUNCTION TYPE: ICD-10-CM

## 2018-06-27 DIAGNOSIS — N40.1 BENIGN NON-NODULAR PROSTATIC HYPERPLASIA WITH LOWER URINARY TRACT SYMPTOMS: ICD-10-CM

## 2018-06-27 DIAGNOSIS — I25.10 CORONARY ARTERY STENOSIS: ICD-10-CM

## 2018-06-27 DIAGNOSIS — I21.4 NON-ST ELEVATION MYOCARDIAL INFARCTION (NSTEMI) (HCC): ICD-10-CM

## 2018-06-27 DIAGNOSIS — I10 ESSENTIAL HYPERTENSION: ICD-10-CM

## 2018-06-27 DIAGNOSIS — E55.9 VITAMIN D DEFICIENCY DISEASE: ICD-10-CM

## 2018-06-27 DIAGNOSIS — I35.8 AORTIC VALVE SCLEROSIS: ICD-10-CM

## 2018-06-27 DIAGNOSIS — I25.10 CORONARY ARTERY DISEASE INVOLVING NATIVE CORONARY ARTERY OF NATIVE HEART WITHOUT ANGINA PECTORIS: ICD-10-CM

## 2018-06-27 DIAGNOSIS — Z12.11 COLON CANCER SCREENING: ICD-10-CM

## 2018-06-27 DIAGNOSIS — E78.2 MIXED HYPERLIPIDEMIA: ICD-10-CM

## 2018-06-27 PROCEDURE — 99214 OFFICE O/P EST MOD 30 MIN: CPT | Performed by: FAMILY MEDICINE

## 2018-06-27 RX ORDER — ATORVASTATIN CALCIUM 80 MG/1
TABLET, FILM COATED ORAL
Qty: 90 TAB | Refills: 3 | Status: SHIPPED | OUTPATIENT
Start: 2018-06-27 | End: 2019-07-09 | Stop reason: SDUPTHER

## 2018-06-27 RX ORDER — SILDENAFIL 100 MG/1
100 TABLET, FILM COATED ORAL PRN
Qty: 16 TAB | Refills: 4 | Status: SHIPPED | OUTPATIENT
Start: 2018-06-27 | End: 2019-11-12 | Stop reason: SDUPTHER

## 2018-06-27 RX ORDER — NITROGLYCERIN 0.4 MG/1
0.4 TABLET SUBLINGUAL PRN
Qty: 25 TAB | Refills: 11 | Status: SHIPPED | OUTPATIENT
Start: 2018-06-27 | End: 2019-11-12 | Stop reason: SDUPTHER

## 2018-06-27 RX ORDER — TAMSULOSIN HYDROCHLORIDE 0.4 MG/1
0.8 CAPSULE ORAL DAILY
Qty: 180 CAP | Refills: 3 | Status: SHIPPED | OUTPATIENT
Start: 2018-06-27 | End: 2019-07-09 | Stop reason: SDUPTHER

## 2018-06-27 ASSESSMENT — PATIENT HEALTH QUESTIONNAIRE - PHQ9: CLINICAL INTERPRETATION OF PHQ2 SCORE: 0

## 2018-06-28 NOTE — PROGRESS NOTES
Patient comes in with several issues.  He is due for lab work.  He has no chest pains and no shortness of breath.  He is retired now and is happy with that.  He needs refills of some of his medications.      Review of Systems   Constitutional: Negative.  Negative for fever, chills, weight loss and malaise/fatigue.   HENT: Negative for hearing loss, ear pain, congestion, sore throat, neck pain and tinnitus.    Eyes: Negative for blurred vision, double vision and pain.   Respiratory: Negative for cough, hemoptysis, shortness of breath and wheezing.    Cardiovascular: Negative for chest pain, palpitations, orthopnea, claudication, leg swelling and PND.   Gastrointestinal: Negative for heartburn, nausea, vomiting, abdominal pain, diarrhea, constipation, blood in stool and melena.   Genitourinary: Negative for incontinence, dysuria, urgency, frequency and hematuria.  Male--positive for erectile dysfunction  Musculoskeletal: Negative for myalgias, back pain and joint pain.   Skin: Negative for rash and itching. No lesions that will not heal.  Neurological: Negative for dizziness, tingling, tremors, focal weakness, seizures, loss of consciousness and headaches.   Endo/Heme/Allergies: Negative for environmental allergies and polydipsia.  Does not bruise/bleed easily.   Psychiatric/Behavioral: Negative for depression, memory loss and substance abuse.  The patient is not nervous/anxious and does not have insomnia.  All others negative.    I reviewed the following    Past Medical History:   Diagnosis Date   • Benign prostatic hypertrophy    • Coronary artery stenosis    • Erectile dysfunction    • Hyperlipidemia    • Hypertension    • Hypertrophy of prostate without urinary obstruction and other lower urinary tract symptoms (LUTS)    • Non-ST elevation myocardial infarction (NSTEMI) ([facility])         Past Surgical History:   Procedure Laterality Date   • OTHER CARDIAC SURGERY  CATH STENT PLACEMENT    Dr Harris  11/2010        Allergies   Allergen Reactions   • Bloodless        Current Outpatient Prescriptions   Medication Sig Dispense Refill   • metoprolol (LOPRESSOR) 25 MG Tab Take 1 Tab by mouth 2 times a day. 180 Tab 3   • atorvastatin (LIPITOR) 80 MG tablet TAKE 1 TABLET BY MOUTH AT BEDTIME 90 Tab 3   • tamsulosin (FLOMAX) 0.4 MG capsule Take 2 Caps by mouth every day. 180 Cap 3   • nitroglycerin (NITROSTAT) 0.4 MG SL Tab Place 1 Tab under tongue as needed for Chest Pain. 25 Tab 11   • sildenafil citrate (VIAGRA) 100 MG tablet Take 1 Tab by mouth as needed for Erectile Dysfunction. 16 Tab 4   • aspirin 81 MG tablet Take 1 Tab by mouth every day. 90 Tab 3   • therapeutic multivitamin-minerals (THERAGRAN-M) TABS Take 1 Tab by mouth every day.     • Cholecalciferol (VITAMIN D3) 1000 UNIT TABS Take 2 Tabs by mouth every day. 100 Tab 3   • tetanus-dipth-acell pertussis (ADACEL) 5-2-15.5 LF-MCG/0.5 Suspension 0.5 mL by Intramuscular route Once PRN (Give 1 Vial Tdap IM  Z23). 1 Vial 0   • Ascorbic Acid (VITAMIN C PO) Take  by mouth.         No current facility-administered medications for this visit.         Family History   Problem Relation Age of Onset   • Hypertension Mother    • Diabetes Father    • Heart Disease Father        Social History     Social History   • Marital status:      Spouse name: N/A   • Number of children: N/A   • Years of education: N/A     Occupational History   • Not on file.     Social History Main Topics   • Smoking status: Never Smoker   • Smokeless tobacco: Never Used   • Alcohol use Yes      Comment: moderate   • Drug use: No   • Sexual activity: Yes     Partners: Female     Birth control/ protection: Post-Menopausal     Other Topics Concern   • Not on file     Social History Narrative   • No narrative on file        Physical Exam   Nursing note and vitals reviewed.  Constitutional: He is oriented. He appears well-developed and well-nourished. He appears not diaphoretic. No distress.   Head:  Normocephalic and atraumatic.   Right Ear: External ear normal. Ear canal and TM normal  Left Ear: External ear normal. Ear canal and TM normal  Nose: Nose normal.   Mouth/Throat: Oropharynx is clear and moist. No oropharyngeal exudate.   Eyes: Conjunctivae and extraocular motions are normal. Pupils are equal, round, and reactive to light. Right eye exhibits no discharge. Left eye exhibits no discharge. No scleral icterus. Fundi benign bilaterally   Neck: Normal range of motion. Neck supple. No JVD present. No tracheal deviation present. No thyromegaly present.   Cardiovascular: Normal rate, regular rhythm, normal heart sounds and intact distal pulses.  Exam reveals no gallop and no friction rub.    No murmur heard.  Pulmonary/Chest: Effort normal and breath sounds normal. No stridor. No respiratory distress. He has no wheezes. He has no rales. He exhibits no tenderness.   Abdominal: Soft. Bowel sounds are normal. He exhibits no distension and no mass. No tenderness. No hepatosplenomegaly. He has no rebound and no guarding. Hernia confirmed negative in the right inguinal area and confirmed negative in the left inguinal area.   Genitourinary: Penis normal. Rectal exam shows no external hemorrhoid, no internal hemorrhoid, no fissure, no mass, no tenderness and anal tone normal. Guaiac negative stool. Prostate is 2+ enlarged and not tender. There are no prostate masses. Right testis shows no mass, no swelling, no tenderness. Right testis is descended. Left testis shows no mass, no swelling, no tenderness. Left testis is descended. Circumcised. No phimosis, penile erythema or penile tenderness. No discharge found.   Musculoskeletal: Normal range of motion. He exhibits no edema and no tenderness.   Lymphadenopathy:     He has no cervical adenopathy.   No supraclavicular adenopathy.       Right: No inguinal adenopathy present.        Left: No inguinal adenopathy present.   Neurological: He is alert and oriented. He  displays normal reflexes. No cranial nerve deficit. He exhibits normal muscle tone. Coordination normal.   Skin: Skin is warm and dry. No rash noted. He is not diaphoretic. No erythema. No pallor.   Psychiatric: He has a normal mood and appropriate affect. His behavior is normal. Judgment and thought content normal.    1. Aortic valve sclerosis  REFERRAL TO CARDIOLOGY--Dr. Malagon whom the patient likes very much    Echocardiogram 9/2017   2. Essential hypertension --controlled CBC WITH DIFFERENTIAL    COMP METABOLIC PANEL    LIPID PROFILE    URINALYSIS,CULTURE IF INDICATED    metoprolol (LOPRESSOR) 25 MG Tab   3. Decreased hearing of both ears   stable   4. Vitamin D deficiency disease --needs reassessment VITAMIN D 25-HYDROXY   5. Mixed hyperlipidemia--needs reassessment COMP METABOLIC PANEL    LIPID PROFILE    atorvastatin (LIPITOR) 80 MG tablet   6. Non-ST elevation myocardial infarction (NSTEMI) ([facility])  COMP METABOLIC PANEL    LIPID PROFILE    atorvastatin (LIPITOR) 80 MG tablet    REFERRAL TO CARDIOLOGY--Dr Malagon   7. Benign non-nodular prostatic hyperplasia with lower urinary tract symptoms  PROSTATE SPECIFIC AG SCREENING    tamsulosin (FLOMAX) 0.4 MG capsule   8. Colon cancer screening  OCCULT BLOOD FECES IMMUNOASSAY   9. Prostate cancer screening  PROSTATE SPECIFIC AG SCREENING   10. Coronary artery disease involving native coronary artery of native heart without angina pectoris  metoprolol (LOPRESSOR) 25 MG Tab    atorvastatin (LIPITOR) 80 MG tablet    REFERRAL TO CARDIOLOGY--Dr Malagon   11. Coronary artery stenosis  atorvastatin (LIPITOR) 80 MG tablet    REFERRAL TO CARDIOLOGY   12. Coronary artery disease with angina pectoris, unspecified vessel or lesion type, unspecified whether native or transplanted heart ([facility])  nitroglycerin (NITROSTAT) 0.4 MG SL Tab   13. Erectile dysfunction, unspecified erectile dysfunction type  sildenafil citrate (VIAGRA) 100 MG tablet--the patient is advised to never take  this within 48 hours of needing nitroglycerin.  He has not needed nitroglycerin for several years.     Recheck 1 year or as needed    Please note that this dictation was created using voice recognition software. I have worked with consultants from the vendor as well as technical experts from [facility] to optimize the interface. I have made every reasonable attempt to correct obvious errors, but I expect that there are errors of grammar and possibly content that I did not discover before finalizing the note.

## 2018-10-23 ENCOUNTER — OFFICE VISIT (OUTPATIENT)
Dept: CARDIOLOGY | Facility: MEDICAL CENTER | Age: 68
End: 2018-10-23
Payer: MEDICARE

## 2018-10-23 VITALS
BODY MASS INDEX: 27.35 KG/M2 | DIASTOLIC BLOOD PRESSURE: 58 MMHG | HEIGHT: 70 IN | HEART RATE: 60 BPM | WEIGHT: 191 LBS | OXYGEN SATURATION: 98 % | SYSTOLIC BLOOD PRESSURE: 106 MMHG

## 2018-10-23 DIAGNOSIS — I10 ESSENTIAL HYPERTENSION: ICD-10-CM

## 2018-10-23 DIAGNOSIS — E78.2 MIXED HYPERLIPIDEMIA: ICD-10-CM

## 2018-10-23 DIAGNOSIS — I25.10 CORONARY ARTERY DISEASE INVOLVING NATIVE CORONARY ARTERY OF NATIVE HEART WITHOUT ANGINA PECTORIS: ICD-10-CM

## 2018-10-23 PROCEDURE — 99214 OFFICE O/P EST MOD 30 MIN: CPT | Performed by: INTERNAL MEDICINE

## 2018-10-23 ASSESSMENT — ENCOUNTER SYMPTOMS
PND: 0
BRUISES/BLEEDS EASILY: 0
LOSS OF CONSCIOUSNESS: 0
ABDOMINAL PAIN: 0
ORTHOPNEA: 0
SHORTNESS OF BREATH: 0
DIZZINESS: 0
FALLS: 0
PALPITATIONS: 0
DEPRESSION: 0

## 2018-10-23 NOTE — LETTER
[facility]   [address]   [address]   [place] [address]  Phone: [phone]  Fax: [phone]              Jose L Lin  1950    Encounter Date: 10/23/2018    Lynda Malagon M.D.          PROGRESS NOTE:  Subjective:   Jose L Lin is a 67 y.o. male presenting today for follow-up on coronary disease.    Pertinent history:  Coronary artery disease status post PCI to the circumflex in 2010  Hypertension  Hyperlipidemia    Patient has been doing really well.  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  Denies any anginal symptoms.  No palpitations or dizziness.  His blood pressure has been mostly well controlled, systolics 110s-120s.  For his hyperlipidemia is currently on Lipitor which is tolerating well.  No side effects.    Past Medical History:   Diagnosis Date   • Benign prostatic hypertrophy    • Coronary artery stenosis    • Erectile dysfunction    • Hyperlipidemia    • Hypertension    • Hypertrophy of prostate without urinary obstruction and other lower urinary tract symptoms (LUTS)    • Non-ST elevation myocardial infarction (NSTEMI) ([facility])      Past Surgical History:   Procedure Laterality Date   • OTHER CARDIAC SURGERY  CATH STENT PLACEMENT    Dr Harris  11/2010     Family History   Problem Relation Age of Onset   • Hypertension Mother    • Diabetes Father    • Heart Disease Father      History   Smoking Status   • Never Smoker   Smokeless Tobacco   • Never Used     Allergies   Allergen Reactions   • Bloodless      Outpatient Encounter Prescriptions as of 10/23/2018   Medication Sig Dispense Refill   • metoprolol (LOPRESSOR) 25 MG Tab Take 1 Tab by mouth 2 times a day. 180 Tab 3   • atorvastatin (LIPITOR) 80 MG tablet TAKE 1 TABLET BY MOUTH AT BEDTIME 90 Tab 3   • tamsulosin (FLOMAX) 0.4 MG capsule Take 2 Caps by mouth every day. 180 Cap 3   • nitroglycerin (NITROSTAT) 0.4 MG SL Tab Place 1 Tab under tongue as  "needed for Chest Pain. 25 Tab 11   • aspirin 81 MG tablet Take 1 Tab by mouth every day. 90 Tab 3   • therapeutic multivitamin-minerals (THERAGRAN-M) TABS Take 1 Tab by mouth every day.     • Cholecalciferol (VITAMIN D3) 1000 UNIT TABS Take 2 Tabs by mouth every day. 100 Tab 3   • Ascorbic Acid (VITAMIN C PO) Take  by mouth.       • sildenafil citrate (VIAGRA) 100 MG tablet Take 1 Tab by mouth as needed for Erectile Dysfunction. 16 Tab 4   • [DISCONTINUED] tetanus-dipth-acell pertussis (ADACEL) 5-2-15.5 LF-MCG/0.5 Suspension 0.5 mL by Intramuscular route Once PRN (Give 1 Vial Tdap IM  Z23). (Patient not taking: Reported on 10/23/2018) 1 Vial 0     No facility-administered encounter medications on file as of 10/23/2018.      Review of Systems   Constitutional: Negative for malaise/fatigue.   Respiratory: Negative for shortness of breath.    Cardiovascular: Negative for chest pain, palpitations, orthopnea, leg swelling and PND.   Gastrointestinal: Negative for abdominal pain.   Musculoskeletal: Negative for falls.   Neurological: Negative for dizziness and loss of consciousness.   Endo/Heme/Allergies: Does not bruise/bleed easily.   Psychiatric/Behavioral: Negative for depression.   All other systems reviewed and are negative.       Objective:   /58 (BP Location: Right arm, Patient Position: Sitting, BP Cuff Size: Adult)   Pulse 60   Ht 1.765 m (5' 9.5\")   Wt 86.6 kg (191 lb)   SpO2 98%   BMI 27.80 kg/m²      Physical Exam   Constitutional: He is oriented to person, place, and time. No distress.   HENT:   Head: Normocephalic and atraumatic.   Eyes: Conjunctivae are normal.   Neck: Normal range of motion. Neck supple. No JVD present.   Cardiovascular: Normal rate and regular rhythm.  Exam reveals no gallop and no friction rub.    Murmur heard.   Crescendo decrescendo systolic murmur is present with a grade of 2/6   Pulmonary/Chest: Effort normal and breath sounds normal. No respiratory distress. He has no " wheezes. He has no rales.   Abdominal: Soft. He exhibits no distension. There is no tenderness.   Musculoskeletal: He exhibits no edema.   Neurological: He is alert and oriented to person, place, and time.   Skin: Skin is warm and dry. He is not diaphoretic.   Psychiatric: He has a normal mood and affect.   Nursing note and vitals reviewed.     Echocardiogram performed September 2017 was personally reviewed and per my interpretation shows normal LV systolic function.  EF 55%.  Aortic valve sclerosis.  No major valvular pathology noted.    Assessment:     1. Mixed hyperlipidemia  LIPID PROFILE   2. Essential hypertension     3. Coronary artery disease involving native coronary artery of native heart without angina pectoris  BASIC METABOLIC PANEL       Medical Decision Making:  Today's Assessment / Status / Plan:     Coronary artery disease: Status post PCI.  Free of anginal symptoms.  Continue aspirin, statin and metoprolol at current dose. Chem-7 ordered today to evaluate renal function.    Hypertension: Blood pressure is at goal.  Continue metoprolol at current dose.    Hyperlipidemia: Lipid panel ordered today.  Continue Lipitor at current dose.    Systolic murmur: Echocardiogram reviewed with patient and showed aortic valve sclerosis.  No further workup needed at this time.    Return to clinic in 6 months or earlier if needed.    Thank you for allowing me to participate in the care of this patient. Please do not hesitate to contact me with any questions.    Lynda Malagon MD  Cardiologist  [facility]      PLEASE NOTE: This dictation was created using voice recognition software.           Callum rCuz M.D.  [address]  [place] [address]  VIA In Basket

## 2019-02-08 ENCOUNTER — TELEPHONE (OUTPATIENT)
Dept: CARDIOLOGY | Facility: MEDICAL CENTER | Age: 69
End: 2019-02-08

## 2019-02-11 ENCOUNTER — OFFICE VISIT (OUTPATIENT)
Dept: CARDIOLOGY | Facility: MEDICAL CENTER | Age: 69
End: 2019-02-11
Payer: MEDICARE

## 2019-02-11 VITALS
HEIGHT: 70 IN | SYSTOLIC BLOOD PRESSURE: 122 MMHG | DIASTOLIC BLOOD PRESSURE: 78 MMHG | HEART RATE: 78 BPM | BODY MASS INDEX: 28.63 KG/M2 | WEIGHT: 200 LBS | OXYGEN SATURATION: 94 %

## 2019-02-11 DIAGNOSIS — Z79.899 ENCOUNTER FOR LONG-TERM (CURRENT) USE OF HIGH-RISK MEDICATION: ICD-10-CM

## 2019-02-11 DIAGNOSIS — R07.9 CHEST PAIN, UNSPECIFIED TYPE: ICD-10-CM

## 2019-02-11 DIAGNOSIS — I10 ESSENTIAL HYPERTENSION: ICD-10-CM

## 2019-02-11 DIAGNOSIS — I25.10 CORONARY ARTERY DISEASE INVOLVING NATIVE CORONARY ARTERY OF NATIVE HEART WITHOUT ANGINA PECTORIS: ICD-10-CM

## 2019-02-11 DIAGNOSIS — E78.2 MIXED HYPERLIPIDEMIA: ICD-10-CM

## 2019-02-11 LAB — EKG IMPRESSION: NORMAL

## 2019-02-11 PROCEDURE — 99215 OFFICE O/P EST HI 40 MIN: CPT | Performed by: INTERNAL MEDICINE

## 2019-02-11 PROCEDURE — 93000 ELECTROCARDIOGRAM COMPLETE: CPT | Performed by: INTERNAL MEDICINE

## 2019-02-11 ASSESSMENT — ENCOUNTER SYMPTOMS
ORTHOPNEA: 0
DEPRESSION: 0
PND: 0
FALLS: 0
PALPITATIONS: 0
DIZZINESS: 0
ABDOMINAL PAIN: 0
SHORTNESS OF BREATH: 0
LOSS OF CONSCIOUSNESS: 0

## 2019-02-11 NOTE — PROGRESS NOTES
Subjective:   Jose L Lin is a 68-year-old male presenting today for follow-up on coronary artery disease.    Pertinent history:  Coronary artery disease status post PCI to the circumflex in 2010  Hypertension  Hyperlipidemia    Patient reports that about 1 month ago he started having left-sided sharp nonradiating chest pain that would occur without any clear triggers and would spontaneously resolve anywhere from 10-60 minutes.  He denies any associated dyspnea.  Symptoms occurred almost daily for multiple days and for the past couple of weeks he has had no recurrent symptoms.    At the time of his PCI, he only had dyspnea.  He did not have any chest discomfort.    His blood pressures have been well controlled, like today.  He continues to be on Lipitor for his hyperlipidemia which is tolerating well.    Past Medical History:   Diagnosis Date   • Benign prostatic hypertrophy    • Coronary artery stenosis    • Erectile dysfunction    • Hyperlipidemia    • Hypertension    • Hypertrophy of prostate without urinary obstruction and other lower urinary tract symptoms (LUTS)    • Non-ST elevation myocardial infarction (NSTEMI) ([facility])      Past Surgical History:   Procedure Laterality Date   • OTHER CARDIAC SURGERY  CATH STENT PLACEMENT    Dr Harris  11/2010     Family History   Problem Relation Age of Onset   • Hypertension Mother    • Diabetes Father    • Heart Disease Father      History   Smoking Status   • Never Smoker   Smokeless Tobacco   • Never Used     Allergies   Allergen Reactions   • Bloodless      Outpatient Encounter Prescriptions as of 2/11/2019   Medication Sig Dispense Refill   • metoprolol (LOPRESSOR) 25 MG Tab Take 1 Tab by mouth 2 times a day. (Patient taking differently: Take 25 mg by mouth every day.) 180 Tab 3   • atorvastatin (LIPITOR) 80 MG tablet TAKE 1 TABLET BY MOUTH AT BEDTIME 90 Tab 3   • tamsulosin (FLOMAX) 0.4 MG capsule Take 2 Caps by mouth every day. 180 Cap 3   • nitroglycerin  "(NITROSTAT) 0.4 MG SL Tab Place 1 Tab under tongue as needed for Chest Pain. 25 Tab 11   • sildenafil citrate (VIAGRA) 100 MG tablet Take 1 Tab by mouth as needed for Erectile Dysfunction. 16 Tab 4   • aspirin 81 MG tablet Take 1 Tab by mouth every day. 90 Tab 3   • therapeutic multivitamin-minerals (THERAGRAN-M) TABS Take 1 Tab by mouth every day.     • [DISCONTINUED] Cholecalciferol (VITAMIN D3) 1000 UNIT TABS Take 2 Tabs by mouth every day. 100 Tab 3   • [DISCONTINUED] Ascorbic Acid (VITAMIN C PO) Take  by mouth.         No facility-administered encounter medications on file as of 2/11/2019.      Review of Systems   Constitutional: Negative for malaise/fatigue.   Respiratory: Negative for shortness of breath.    Cardiovascular: Positive for chest pain. Negative for palpitations, orthopnea, leg swelling and PND.   Gastrointestinal: Negative for abdominal pain.   Musculoskeletal: Negative for falls.   Neurological: Negative for dizziness and loss of consciousness.   Psychiatric/Behavioral: Negative for depression.   All other systems reviewed and are negative.       Objective:   /78 (BP Location: Right arm, Patient Position: Sitting)   Pulse 78   Ht 1.765 m (5' 9.5\")   Wt 90.7 kg (200 lb)   SpO2 94%   BMI 29.11 kg/m²     Physical Exam   Constitutional: He is oriented to person, place, and time. He appears well-developed and well-nourished. No distress.   HENT:   Head: Normocephalic and atraumatic.   Eyes: Conjunctivae are normal.   Neck: Normal range of motion. Neck supple. No JVD present.   Cardiovascular: Normal rate and regular rhythm.  Exam reveals no gallop and no friction rub.    Murmur heard.   Crescendo decrescendo systolic murmur is present with a grade of 1/6   Pulmonary/Chest: Effort normal and breath sounds normal. No respiratory distress. He has no wheezes. He has no rales. He exhibits no tenderness.   Abdominal: Soft. There is no tenderness.   Musculoskeletal: He exhibits no edema. "   Neurological: He is alert and oriented to person, place, and time.   Skin: Skin is warm and dry. He is not diaphoretic.   Psychiatric: He has a normal mood and affect.   Nursing note and vitals reviewed.     Echocardiogram performed September 2017 showed normal LV systolic function.  EF 55%.  Aortic valve sclerosis.  No major valvular pathology noted.    EKG performed today was personally reviewed and per my interpretation shows sinus rhythm at 67 bpm.  Voltage criteria for left ventricular hypertrophy.  Nonspecific T wave changes.    Assessment:     1. Coronary artery stenosis     2. Mixed hyperlipidemia  Lipid Profile   3. Essential hypertension  Basic Metabolic Panel   4. Encounter for long-term (current) use of high-risk medication         Medical Decision Making:  Today's Assessment / Status / Plan:     Chest pain: New issue  Coronary artery disease:  Patient's pre-PCI anginal symptoms were only dyspnea.  He is now having left-sided chest discomfort which is concerning for angina.  He will be referred for a treadmill myocardial perfusion study for ischemic evaluation.  His EKG today was unremarkable. Continue aspirin.    Hyperlipidemia: Lipid panel ordered today.  Continue Lipitor at current dose.    Hypertension: Blood pressure is at goal.  Patient is only been taking his metoprolol once a day.  I have advised him to start taking it twice daily.      Return to clinic in 6 months or earlier if needed.    Thank you for allowing me to participate in the care of this patient. Please do not hesitate to contact me with any questions.    Lynda Malagon MD  Cardiologist  [facility]      PLEASE NOTE: This dictation was created using voice recognition software.

## 2019-02-11 NOTE — LETTER
[facility]   [address]   [address]  [place] [address]  Phone: [phone]  Fax: [phone]              Jose L Lin  1950    Encounter Date: 2/11/2019    Lynda Malagon M.D.          PROGRESS NOTE:  Subjective:   Jose L Lin is a 68-year-old male presenting today for follow-up on coronary artery disease.    Pertinent history:  Coronary artery disease status post PCI to the circumflex in 2010  Hypertension  Hyperlipidemia    Patient reports that about 1 month ago he started having left-sided sharp nonradiating chest pain that would occur without any clear triggers and would spontaneously resolve anywhere from 10-60 minutes.  He denies any associated dyspnea.  Symptoms occurred almost daily for multiple days and for the past couple of weeks he has had no recurrent symptoms.    At the time of his PCI, he only had dyspnea.  He did not have any chest discomfort.    His blood pressures have been well controlled, like today.  He continues to be on Lipitor for his hyperlipidemia which is tolerating well.    Past Medical History:   Diagnosis Date   • Benign prostatic hypertrophy    • Coronary artery stenosis    • Erectile dysfunction    • Hyperlipidemia    • Hypertension    • Hypertrophy of prostate without urinary obstruction and other lower urinary tract symptoms (LUTS)    • Non-ST elevation myocardial infarction (NSTEMI) ([facility])      Past Surgical History:   Procedure Laterality Date   • OTHER CARDIAC SURGERY  CATH STENT PLACEMENT    Dr Harris  11/2010     Family History   Problem Relation Age of Onset   • Hypertension Mother    • Diabetes Father    • Heart Disease Father      History   Smoking Status   • Never Smoker   Smokeless Tobacco   • Never Used     Allergies   Allergen Reactions   • Bloodless      Outpatient Encounter Prescriptions as of 2/11/2019   Medication Sig Dispense Refill   • metoprolol (LOPRESSOR) 25 MG Tab Take 1 Tab by mouth 2  "times a day. (Patient taking differently: Take 25 mg by mouth every day.) 180 Tab 3   • atorvastatin (LIPITOR) 80 MG tablet TAKE 1 TABLET BY MOUTH AT BEDTIME 90 Tab 3   • tamsulosin (FLOMAX) 0.4 MG capsule Take 2 Caps by mouth every day. 180 Cap 3   • nitroglycerin (NITROSTAT) 0.4 MG SL Tab Place 1 Tab under tongue as needed for Chest Pain. 25 Tab 11   • sildenafil citrate (VIAGRA) 100 MG tablet Take 1 Tab by mouth as needed for Erectile Dysfunction. 16 Tab 4   • aspirin 81 MG tablet Take 1 Tab by mouth every day. 90 Tab 3   • therapeutic multivitamin-minerals (THERAGRAN-M) TABS Take 1 Tab by mouth every day.     • [DISCONTINUED] Cholecalciferol (VITAMIN D3) 1000 UNIT TABS Take 2 Tabs by mouth every day. 100 Tab 3   • [DISCONTINUED] Ascorbic Acid (VITAMIN C PO) Take  by mouth.         No facility-administered encounter medications on file as of 2/11/2019.      Review of Systems   Constitutional: Negative for malaise/fatigue.   Respiratory: Negative for shortness of breath.    Cardiovascular: Positive for chest pain. Negative for palpitations, orthopnea, leg swelling and PND.   Gastrointestinal: Negative for abdominal pain.   Musculoskeletal: Negative for falls.   Neurological: Negative for dizziness and loss of consciousness.   Psychiatric/Behavioral: Negative for depression.   All other systems reviewed and are negative.       Objective:   /78 (BP Location: Right arm, Patient Position: Sitting)   Pulse 78   Ht 1.765 m (5' 9.5\")   Wt 90.7 kg (200 lb)   SpO2 94%   BMI 29.11 kg/m²      Physical Exam   Constitutional: He is oriented to person, place, and time. He appears well-developed and well-nourished. No distress.   HENT:   Head: Normocephalic and atraumatic.   Eyes: Conjunctivae are normal.   Neck: Normal range of motion. Neck supple. No JVD present.   Cardiovascular: Normal rate and regular rhythm.  Exam reveals no gallop and no friction rub.    Murmur heard.   Crescendo decrescendo systolic murmur is " present with a grade of 1/6   Pulmonary/Chest: Effort normal and breath sounds normal. No respiratory distress. He has no wheezes. He has no rales. He exhibits no tenderness.   Abdominal: Soft. There is no tenderness.   Musculoskeletal: He exhibits no edema.   Neurological: He is alert and oriented to person, place, and time.   Skin: Skin is warm and dry. He is not diaphoretic.   Psychiatric: He has a normal mood and affect.   Nursing note and vitals reviewed.     Echocardiogram performed September 2017 showed normal LV systolic function.  EF 55%.  Aortic valve sclerosis.  No major valvular pathology noted.    EKG performed today was personally reviewed and per my interpretation shows sinus rhythm at 67 bpm.  Voltage criteria for left ventricular hypertrophy.  Nonspecific T wave changes.    Assessment:     1. Coronary artery stenosis     2. Mixed hyperlipidemia  Lipid Profile   3. Essential hypertension  Basic Metabolic Panel   4. Encounter for long-term (current) use of high-risk medication         Medical Decision Making:  Today's Assessment / Status / Plan:     Chest pain: New issue  Coronary artery disease:  Patient's pre-PCI anginal symptoms were only dyspnea.  He is now having left-sided chest discomfort which is concerning for angina.  He will be referred for a treadmill myocardial perfusion study for ischemic evaluation.  His EKG today was unremarkable. Continue aspirin.    Hyperlipidemia: Lipid panel ordered today.  Continue Lipitor at current dose.    Hypertension: Blood pressure is at goal.  Patient is only been taking his metoprolol once a day.  I have advised him to start taking it twice daily.      Return to clinic in 6 months or earlier if needed.    Thank you for allowing me to participate in the care of this patient. Please do not hesitate to contact me with any questions.    Lynda Malagon MD  Cardiologist  [facility]      PLEASE NOTE: This dictation was created  using voice recognition software.           Callum Cruz M.D.  [address] [address]  [place] [id number]  VIA In Basket

## 2019-02-25 ENCOUNTER — HOSPITAL ENCOUNTER (OUTPATIENT)
Dept: RADIOLOGY | Facility: MEDICAL CENTER | Age: 69
End: 2019-02-25
Attending: INTERNAL MEDICINE
Payer: MEDICARE

## 2019-02-25 DIAGNOSIS — R07.9 CHEST PAIN, UNSPECIFIED TYPE: ICD-10-CM

## 2019-02-25 DIAGNOSIS — I25.10 CORONARY ARTERY DISEASE INVOLVING NATIVE CORONARY ARTERY OF NATIVE HEART WITHOUT ANGINA PECTORIS: ICD-10-CM

## 2019-03-06 ENCOUNTER — HOSPITAL ENCOUNTER (OUTPATIENT)
Dept: LAB | Facility: MEDICAL CENTER | Age: 69
End: 2019-03-06
Attending: INTERNAL MEDICINE
Payer: MEDICARE

## 2019-03-06 DIAGNOSIS — E78.2 MIXED HYPERLIPIDEMIA: ICD-10-CM

## 2019-03-06 DIAGNOSIS — I10 ESSENTIAL HYPERTENSION: ICD-10-CM

## 2019-03-06 LAB
ANION GAP SERPL CALC-SCNC: 10 MMOL/L (ref 0–11.9)
BUN SERPL-MCNC: 15 MG/DL (ref 8–22)
CALCIUM SERPL-MCNC: 10.6 MG/DL (ref 8.5–10.5)
CHLORIDE SERPL-SCNC: 105 MMOL/L (ref 96–112)
CHOLEST SERPL-MCNC: 150 MG/DL (ref 100–199)
CO2 SERPL-SCNC: 26 MMOL/L (ref 20–33)
CREAT SERPL-MCNC: 0.86 MG/DL (ref 0.5–1.4)
FASTING STATUS PATIENT QL REPORTED: NORMAL
GLUCOSE SERPL-MCNC: 93 MG/DL (ref 65–99)
HDLC SERPL-MCNC: 76 MG/DL
LDLC SERPL CALC-MCNC: 59 MG/DL
POTASSIUM SERPL-SCNC: 4.3 MMOL/L (ref 3.6–5.5)
SODIUM SERPL-SCNC: 141 MMOL/L (ref 135–145)
TRIGL SERPL-MCNC: 76 MG/DL (ref 0–149)

## 2019-03-06 PROCEDURE — 80061 LIPID PANEL: CPT

## 2019-03-06 PROCEDURE — 36415 COLL VENOUS BLD VENIPUNCTURE: CPT

## 2019-03-06 PROCEDURE — 80048 BASIC METABOLIC PNL TOTAL CA: CPT

## 2019-03-19 ENCOUNTER — HOSPITAL ENCOUNTER (OUTPATIENT)
Dept: RADIOLOGY | Facility: MEDICAL CENTER | Age: 69
End: 2019-03-19
Attending: FAMILY MEDICINE
Payer: MEDICARE

## 2019-03-19 ENCOUNTER — OFFICE VISIT (OUTPATIENT)
Dept: MEDICAL GROUP | Facility: PHYSICIAN GROUP | Age: 69
End: 2019-03-19
Payer: MEDICARE

## 2019-03-19 VITALS
HEART RATE: 60 BPM | HEIGHT: 70 IN | SYSTOLIC BLOOD PRESSURE: 130 MMHG | RESPIRATION RATE: 16 BRPM | DIASTOLIC BLOOD PRESSURE: 72 MMHG | OXYGEN SATURATION: 95 % | BODY MASS INDEX: 28.63 KG/M2 | TEMPERATURE: 97.7 F | WEIGHT: 200 LBS

## 2019-03-19 DIAGNOSIS — M54.41 ACUTE RIGHT-SIDED LOW BACK PAIN WITH RIGHT-SIDED SCIATICA: ICD-10-CM

## 2019-03-19 DIAGNOSIS — I10 ESSENTIAL HYPERTENSION: ICD-10-CM

## 2019-03-19 PROCEDURE — 99213 OFFICE O/P EST LOW 20 MIN: CPT | Performed by: FAMILY MEDICINE

## 2019-03-19 PROCEDURE — 72100 X-RAY EXAM L-S SPINE 2/3 VWS: CPT

## 2019-03-19 RX ORDER — ETODOLAC 400 MG/1
400 TABLET, FILM COATED ORAL 2 TIMES DAILY
Qty: 30 TAB | Refills: 3 | Status: SHIPPED | OUTPATIENT
Start: 2019-03-19

## 2019-03-19 ASSESSMENT — PATIENT HEALTH QUESTIONNAIRE - PHQ9: CLINICAL INTERPRETATION OF PHQ2 SCORE: 0

## 2019-03-20 NOTE — PROGRESS NOTES
Patient comes in complaining of low back pain which radiates from his right buttocks down into his right leg which started about a month ago after he was shoveling a lot of snow.  He denies any specific injury.  He is tried ibuprofen which helps but not completely.  He has no problems with his bowels.  He did have some urinary retention but that has resolved.  He is on Flomax and that is working well for him.    I reviewed the following    Past Medical History:   Diagnosis Date   • Benign prostatic hypertrophy    • Coronary artery stenosis    • Erectile dysfunction    • Hyperlipidemia    • Hypertension    • Hypertrophy of prostate without urinary obstruction and other lower urinary tract symptoms (LUTS)    • Non-ST elevation myocardial infarction (NSTEMI) ([facility])         Past Surgical History:   Procedure Laterality Date   • OTHER CARDIAC SURGERY  CATH STENT PLACEMENT    Dr Harris  11/2010       Allergies   Allergen Reactions   • Bloodless        Current Outpatient Prescriptions   Medication Sig Dispense Refill   • etodolac (LODINE) 400 MG tablet Take 1 Tab by mouth 2 times a day. Take with food 30 Tab 3   • metoprolol (LOPRESSOR) 25 MG Tab Take 1 Tab by mouth 2 times a day. 180 Tab 3   • atorvastatin (LIPITOR) 80 MG tablet TAKE 1 TABLET BY MOUTH AT BEDTIME 90 Tab 3   • tamsulosin (FLOMAX) 0.4 MG capsule Take 2 Caps by mouth every day. 180 Cap 3   • aspirin 81 MG tablet Take 1 Tab by mouth every day. 90 Tab 3   • therapeutic multivitamin-minerals (THERAGRAN-M) TABS Take 1 Tab by mouth every day.     • nitroglycerin (NITROSTAT) 0.4 MG SL Tab Place 1 Tab under tongue as needed for Chest Pain. 25 Tab 11   • sildenafil citrate (VIAGRA) 100 MG tablet Take 1 Tab by mouth as needed for Erectile Dysfunction. 16 Tab 4     No current facility-administered medications for this visit.         Family History   Problem Relation Age of Onset   • Hypertension Mother    • Diabetes Father    • Heart Disease Father        Social History      Social History   • Marital status:      Spouse name: N/A   • Number of children: N/A   • Years of education: N/A     Occupational History   • Not on file.     Social History Main Topics   • Smoking status: Never Smoker   • Smokeless tobacco: Never Used   • Alcohol use Yes      Comment: moderate   • Drug use: No   • Sexual activity: Yes     Partners: Female     Birth control/ protection: Post-Menopausal     Other Topics Concern   • Not on file     Social History Narrative   • No narrative on file      The patient is well-developed well-nourished and in no acute distress    Pupils equally round and reactive to light    Ears normal    Nose normal    Throat clear    Neck supple no cervical adenopathy no thyromegaly    Chest clear to auscultation    Heart regular rhythm no murmur S3 or S4 appreciated    Back no CVA pain or spasm    Abdomen flat soft good bowel sounds no mass No hepatosplenomegaly no rebound    Skin no rashes or suspicious lesions    DTRs 2+ in ankles knees and biceps--2+ tender over the right sciatic notch.  Straight leg raising is equivocal on the right negative on the left.    Babinski downgoing bilaterally    Pulses 2+ in all 4 extremities    1. Acute right-sided low back pain with right-sided sciatica  DX-LUMBAR SPINE-2 OR 3 VIEWS    etodolac (LODINE) 400 MG tablet--1 p.o. twice daily with food   2. Essential hypertension   controlled          Recheck as needed

## 2019-03-22 ENCOUNTER — TELEPHONE (OUTPATIENT)
Dept: MEDICAL GROUP | Facility: PHYSICIAN GROUP | Age: 69
End: 2019-03-22

## 2019-03-22 NOTE — TELEPHONE ENCOUNTER
1. Caller Name: Jose L Lin                                         Call Back Number: [phone] (home)       Patient approves a detailed voicemail message: N\A    2. SPECIFIC Action To Be Taken: Referral needed    3. Diagnosis/Clinical Reason for Request: Acute right-sided low back pain with right-sided sciatica    4. Specialty & Provider Name/Lab/Imaging Location: [facility] [phone] Fax [phone]    5. Is appointment scheduled for requested order/referral: no    Patient was not informed they will receive a return phone call from the office ONLY if there are any questions before processing their request. Advised to call back if they haven't received a call from the referral department in 5 days.

## 2019-03-25 DIAGNOSIS — M54.41 ACUTE MIDLINE LOW BACK PAIN WITH RIGHT-SIDED SCIATICA: ICD-10-CM

## 2019-07-09 DIAGNOSIS — N40.1 BENIGN NON-NODULAR PROSTATIC HYPERPLASIA WITH LOWER URINARY TRACT SYMPTOMS: ICD-10-CM

## 2019-07-09 DIAGNOSIS — E78.2 MIXED HYPERLIPIDEMIA: ICD-10-CM

## 2019-07-09 DIAGNOSIS — I21.4 NON-ST ELEVATION MYOCARDIAL INFARCTION (NSTEMI) (HCC): ICD-10-CM

## 2019-07-09 DIAGNOSIS — I25.10 CORONARY ARTERY DISEASE INVOLVING NATIVE CORONARY ARTERY OF NATIVE HEART WITHOUT ANGINA PECTORIS: ICD-10-CM

## 2019-07-09 DIAGNOSIS — I25.10 CORONARY ARTERY STENOSIS: ICD-10-CM

## 2019-07-09 RX ORDER — TAMSULOSIN HYDROCHLORIDE 0.4 MG/1
CAPSULE ORAL
Qty: 180 CAP | Refills: 3 | Status: SHIPPED | OUTPATIENT
Start: 2019-07-09 | End: 2019-11-12

## 2019-07-09 RX ORDER — ATORVASTATIN CALCIUM 80 MG/1
TABLET, FILM COATED ORAL
Qty: 90 TAB | Refills: 3 | Status: SHIPPED | OUTPATIENT
Start: 2019-07-09 | End: 2019-11-12 | Stop reason: SDUPTHER

## 2019-09-18 ENCOUNTER — OFFICE VISIT (OUTPATIENT)
Dept: CARDIOLOGY | Facility: MEDICAL CENTER | Age: 69
End: 2019-09-18
Payer: MEDICARE

## 2019-09-18 VITALS
WEIGHT: 196.6 LBS | DIASTOLIC BLOOD PRESSURE: 74 MMHG | OXYGEN SATURATION: 91 % | HEIGHT: 70 IN | SYSTOLIC BLOOD PRESSURE: 120 MMHG | BODY MASS INDEX: 28.15 KG/M2 | HEART RATE: 64 BPM

## 2019-09-18 DIAGNOSIS — E78.2 MIXED HYPERLIPIDEMIA: ICD-10-CM

## 2019-09-18 DIAGNOSIS — I25.10 CORONARY ARTERY DISEASE DUE TO LIPID RICH PLAQUE: ICD-10-CM

## 2019-09-18 DIAGNOSIS — R07.9 CHEST PAIN, UNSPECIFIED TYPE: ICD-10-CM

## 2019-09-18 DIAGNOSIS — I25.83 CORONARY ARTERY DISEASE DUE TO LIPID RICH PLAQUE: ICD-10-CM

## 2019-09-18 DIAGNOSIS — I10 ESSENTIAL HYPERTENSION: ICD-10-CM

## 2019-09-18 DIAGNOSIS — Z79.899 ENCOUNTER FOR LONG-TERM (CURRENT) USE OF HIGH-RISK MEDICATION: ICD-10-CM

## 2019-09-18 PROCEDURE — 99215 OFFICE O/P EST HI 40 MIN: CPT | Performed by: INTERNAL MEDICINE

## 2019-09-18 RX ORDER — FINASTERIDE 5 MG/1
5 TABLET, FILM COATED ORAL DAILY
COMMUNITY
End: 2019-10-16 | Stop reason: SDUPTHER

## 2019-09-18 ASSESSMENT — ENCOUNTER SYMPTOMS
PALPITATIONS: 0
SHORTNESS OF BREATH: 0
LOSS OF CONSCIOUSNESS: 0
DIZZINESS: 0
ABDOMINAL PAIN: 0
DEPRESSION: 0
BACK PAIN: 1
PND: 0
ORTHOPNEA: 0
FALLS: 0

## 2019-09-18 NOTE — LETTER
[facility]   [address], [address]  [place] [address]  Phone: [phone]  Fax: [phone]              Jose L Lin  1950    Encounter Date: 9/18/2019    Lynda Malagon M.D.          PROGRESS NOTE:  Chief Complaint   Patient presents with   • Coronary Artery Disease   • HTN (Controlled)   • Hyperlipidemia       Subjective:   Jose L Lin is a 68 y.o. male who presents today to follow-up on his coronary artery disease.    Pertinent history:  Coronary artery disease status post PCI to the circumflex in 2010  Hypertension  Hyperlipidemia    At his last visit, he was referred for a treadmill myocardial perfusion study.  On the day of his study, apparently he was told that he needs to be off the beta-blocker for 2 days and his stress test was canceled.  Unfortunately we never received any messages regarding this.    Patient and reschedule his study for later date but developed severe sciatica and had to cancel the study.  He is now able to walk without a cane.  He has not had any recurrent chest discomfort since his last appointment with us and wants to know if he still needs the study.    His blood pressures have been well controlled, like today.    For his hyperlipidemia is currently on Lipitor which is tolerating well.    He is currently working with his son who is a  and staying active.      Past Medical History:   Diagnosis Date   • Benign prostatic hypertrophy    • Coronary artery stenosis    • Erectile dysfunction    • Hyperlipidemia    • Hypertension    • Hypertrophy of prostate without urinary obstruction and other lower urinary tract symptoms (LUTS)    • Non-ST elevation myocardial infarction (NSTEMI) ([facility])      Past Surgical History:   Procedure Laterality Date   • OTHER CARDIAC SURGERY  CATH STENT PLACEMENT    Dr Harris  11/2010     Family History   Problem Relation Age of Onset   • Hypertension Mother    • Diabetes Father    •  Heart Disease Father      Social History     Socioeconomic History   • Marital status:      Spouse name: Not on file   • Number of children: Not on file   • Years of education: Not on file   • Highest education level: Not on file   Occupational History   • Not on file   Social Needs   • Financial resource strain: Not on file   • Food insecurity:     Worry: Not on file     Inability: Not on file   • Transportation needs:     Medical: Not on file     Non-medical: Not on file   Tobacco Use   • Smoking status: Never Smoker   • Smokeless tobacco: Never Used   Substance and Sexual Activity   • Alcohol use: Yes     Comment: moderate   • Drug use: No   • Sexual activity: Yes     Partners: Female     Birth control/protection: Post-Menopausal   Lifestyle   • Physical activity:     Days per week: Not on file     Minutes per session: Not on file   • Stress: Not on file   Relationships   • Social connections:     Talks on phone: Not on file     Gets together: Not on file     Attends Yazidism service: Not on file     Active member of club or organization: Not on file     Attends meetings of clubs or organizations: Not on file     Relationship status: Not on file   • Intimate partner violence:     Fear of current or ex partner: Not on file     Emotionally abused: Not on file     Physically abused: Not on file     Forced sexual activity: Not on file   Other Topics Concern   • Not on file   Social History Narrative   • Not on file     Allergies   Allergen Reactions   • Bloodless      Outpatient Encounter Medications as of 9/18/2019   Medication Sig Dispense Refill   • finasteride (PROSCAR) 5 MG Tab Take 5 mg by mouth every day.     • tamsulosin (FLOMAX) 0.4 MG capsule TAKE TWO CAPSULE BY MOUTH ONCE DAILY  180 Cap 3   • atorvastatin (LIPITOR) 80 MG tablet TAKE ONE TABLET BY MOUTH ONE TIME DAILY  90 Tab 3   • etodolac (LODINE) 400 MG tablet Take 1 Tab by mouth 2 times a day. Take with food 30 Tab 3   • metoprolol (LOPRESSOR)  "25 MG Tab Take 1 Tab by mouth 2 times a day. 180 Tab 3   • nitroglycerin (NITROSTAT) 0.4 MG SL Tab Place 1 Tab under tongue as needed for Chest Pain. 25 Tab 11   • sildenafil citrate (VIAGRA) 100 MG tablet Take 1 Tab by mouth as needed for Erectile Dysfunction. 16 Tab 4   • aspirin 81 MG tablet Take 1 Tab by mouth every day. 90 Tab 3   • therapeutic multivitamin-minerals (THERAGRAN-M) TABS Take 1 Tab by mouth every day.       No facility-administered encounter medications on file as of 9/18/2019.      Review of Systems   Constitutional: Negative for malaise/fatigue.   Respiratory: Negative for shortness of breath.    Cardiovascular: Negative for chest pain, palpitations, orthopnea, leg swelling and PND.   Gastrointestinal: Negative for abdominal pain.   Musculoskeletal: Positive for back pain. Negative for falls.   Neurological: Negative for dizziness and loss of consciousness.   Psychiatric/Behavioral: Negative for depression.   All other systems reviewed and are negative.       Objective:   /74 (BP Location: Left arm, Patient Position: Sitting, BP Cuff Size: Adult)   Pulse 64   Ht 1.765 m (5' 9.5\")   Wt 89.2 kg (196 lb 9.6 oz)   SpO2 91%   BMI 28.62 kg/m²      Physical Exam   Constitutional: He is oriented to person, place, and time. He appears well-developed and well-nourished. No distress.   HENT:   Head: Normocephalic and atraumatic.   Eyes: Conjunctivae are normal. No scleral icterus.   Neck: Normal range of motion. Neck supple.   Cardiovascular: Normal rate, regular rhythm and normal heart sounds. Exam reveals no gallop and no friction rub.   No murmur heard.  Pulmonary/Chest: Effort normal and breath sounds normal. No respiratory distress. He has no wheezes. He has no rales.   Abdominal: Soft. He exhibits no distension. There is no tenderness.   Musculoskeletal: He exhibits no edema.   Neurological: He is alert and oriented to person, place, and time.   Skin: Skin is warm and dry. He is not " diaphoretic.   Psychiatric: He has a normal mood and affect. His behavior is normal.   Nursing note and vitals reviewed.    Echocardiogram performed September 2017 showed normal LV systolic function.  EF 55%.  Aortic valve sclerosis.  No major valvular pathology noted.    Labs performed in March 2019 were reviewed and showed creatinine 0.86.  LDL 59.    Assessment:     1. Coronary artery disease due to lipid rich plaque     2. Mixed hyperlipidemia     3. Essential hypertension     4. Chest pain, unspecified type     5. Encounter for long-term (current) use of high-risk medication         Medical Decision Making:  Today's Assessment / Status / Plan:     Chest pain:  Coronary artery disease:  Hyperlipidemia:  Hypertension:    For unclear reasons, patient's myocardial perfusion study was canceled.  Since he is currently asymptomatic, okay to hold off on further testing.  If the patient starts having recurrent chest pain, he should let us know.  Otherwise continue aspirin and Lipitor.  His LDL is at goal.  Blood pressure is at goal.  Continue metoprolol at current dose.    Return to clinic in 1 year or earlier if needed.    Thank you for allowing me to participate in the care of this patient. Please do not hesitate to contact me with any questions.    Lynda Malagon MD  Cardiologist  [facility]      PLEASE NOTE: This dictation was created using voice recognition software.           Callum Cruz M.D.  [address] [address]  [place] [id number]  VIA In Basket

## 2019-09-18 NOTE — PROGRESS NOTES
Chief Complaint   Patient presents with   • Coronary Artery Disease   • HTN (Controlled)   • Hyperlipidemia       Subjective:   Jose L Lin is a 68 y.o. male who presents today to follow-up on his coronary artery disease.    Pertinent history:  Coronary artery disease status post PCI to the circumflex in 2010  Hypertension  Hyperlipidemia    At his last visit, he was referred for a treadmill myocardial perfusion study.  On the day of his study, apparently he was told that he needs to be off the beta-blocker for 2 days and his stress test was canceled.  Unfortunately we never received any messages regarding this.    Patient and reschedule his study for later date but developed severe sciatica and had to cancel the study.  He is now able to walk without a cane.  He has not had any recurrent chest discomfort since his last appointment with us and wants to know if he still needs the study.    His blood pressures have been well controlled, like today.    For his hyperlipidemia is currently on Lipitor which is tolerating well.    He is currently working with his son who is a  and staying active.      Past Medical History:   Diagnosis Date   • Benign prostatic hypertrophy    • Coronary artery stenosis    • Erectile dysfunction    • Hyperlipidemia    • Hypertension    • Hypertrophy of prostate without urinary obstruction and other lower urinary tract symptoms (LUTS)    • Non-ST elevation myocardial infarction (NSTEMI) ([facility])      Past Surgical History:   Procedure Laterality Date   • OTHER CARDIAC SURGERY  CATH STENT PLACEMENT    Dr Harris  11/2010     Family History   Problem Relation Age of Onset   • Hypertension Mother    • Diabetes Father    • Heart Disease Father      Social History     Socioeconomic History   • Marital status:      Spouse name: Not on file   • Number of children: Not on file   • Years of education: Not on file   • Highest education level: Not on file   Occupational History    • Not on file   Social Needs   • Financial resource strain: Not on file   • Food insecurity:     Worry: Not on file     Inability: Not on file   • Transportation needs:     Medical: Not on file     Non-medical: Not on file   Tobacco Use   • Smoking status: Never Smoker   • Smokeless tobacco: Never Used   Substance and Sexual Activity   • Alcohol use: Yes     Comment: moderate   • Drug use: No   • Sexual activity: Yes     Partners: Female     Birth control/protection: Post-Menopausal   Lifestyle   • Physical activity:     Days per week: Not on file     Minutes per session: Not on file   • Stress: Not on file   Relationships   • Social connections:     Talks on phone: Not on file     Gets together: Not on file     Attends Pentecostalism service: Not on file     Active member of club or organization: Not on file     Attends meetings of clubs or organizations: Not on file     Relationship status: Not on file   • Intimate partner violence:     Fear of current or ex partner: Not on file     Emotionally abused: Not on file     Physically abused: Not on file     Forced sexual activity: Not on file   Other Topics Concern   • Not on file   Social History Narrative   • Not on file     Allergies   Allergen Reactions   • Bloodless      Outpatient Encounter Medications as of 9/18/2019   Medication Sig Dispense Refill   • finasteride (PROSCAR) 5 MG Tab Take 5 mg by mouth every day.     • tamsulosin (FLOMAX) 0.4 MG capsule TAKE TWO CAPSULE BY MOUTH ONCE DAILY  180 Cap 3   • atorvastatin (LIPITOR) 80 MG tablet TAKE ONE TABLET BY MOUTH ONE TIME DAILY  90 Tab 3   • etodolac (LODINE) 400 MG tablet Take 1 Tab by mouth 2 times a day. Take with food 30 Tab 3   • metoprolol (LOPRESSOR) 25 MG Tab Take 1 Tab by mouth 2 times a day. 180 Tab 3   • nitroglycerin (NITROSTAT) 0.4 MG SL Tab Place 1 Tab under tongue as needed for Chest Pain. 25 Tab 11   • sildenafil citrate (VIAGRA) 100 MG tablet Take 1 Tab by mouth as needed for Erectile Dysfunction.  "16 Tab 4   • aspirin 81 MG tablet Take 1 Tab by mouth every day. 90 Tab 3   • therapeutic multivitamin-minerals (THERAGRAN-M) TABS Take 1 Tab by mouth every day.       No facility-administered encounter medications on file as of 9/18/2019.      Review of Systems   Constitutional: Negative for malaise/fatigue.   Respiratory: Negative for shortness of breath.    Cardiovascular: Negative for chest pain, palpitations, orthopnea, leg swelling and PND.   Gastrointestinal: Negative for abdominal pain.   Musculoskeletal: Positive for back pain. Negative for falls.   Neurological: Negative for dizziness and loss of consciousness.   Psychiatric/Behavioral: Negative for depression.   All other systems reviewed and are negative.       Objective:   /74 (BP Location: Left arm, Patient Position: Sitting, BP Cuff Size: Adult)   Pulse 64   Ht 1.765 m (5' 9.5\")   Wt 89.2 kg (196 lb 9.6 oz)   SpO2 91%   BMI 28.62 kg/m²     Physical Exam   Constitutional: He is oriented to person, place, and time. He appears well-developed and well-nourished. No distress.   HENT:   Head: Normocephalic and atraumatic.   Eyes: Conjunctivae are normal. No scleral icterus.   Neck: Normal range of motion. Neck supple.   Cardiovascular: Normal rate, regular rhythm and normal heart sounds. Exam reveals no gallop and no friction rub.   No murmur heard.  Pulmonary/Chest: Effort normal and breath sounds normal. No respiratory distress. He has no wheezes. He has no rales.   Abdominal: Soft. He exhibits no distension. There is no tenderness.   Musculoskeletal: He exhibits no edema.   Neurological: He is alert and oriented to person, place, and time.   Skin: Skin is warm and dry. He is not diaphoretic.   Psychiatric: He has a normal mood and affect. His behavior is normal.   Nursing note and vitals reviewed.    Echocardiogram performed September 2017 showed normal LV systolic function.  EF 55%.  Aortic valve sclerosis.  No major valvular pathology " noted.    Labs performed in March 2019 were reviewed and showed creatinine 0.86.  LDL 59.    Assessment:     1. Coronary artery disease due to lipid rich plaque     2. Mixed hyperlipidemia     3. Essential hypertension     4. Chest pain, unspecified type     5. Encounter for long-term (current) use of high-risk medication         Medical Decision Making:  Today's Assessment / Status / Plan:     Chest pain:  Coronary artery disease:  Hyperlipidemia:  Hypertension:    For unclear reasons, patient's myocardial perfusion study was canceled.  Since he is currently asymptomatic, okay to hold off on further testing.  If the patient starts having recurrent chest pain, he should let us know.  Otherwise continue aspirin and Lipitor.  His LDL is at goal.  Blood pressure is at goal.  Continue metoprolol at current dose.    Return to clinic in 1 year or earlier if needed.    Thank you for allowing me to participate in the care of this patient. Please do not hesitate to contact me with any questions.    Lynda Malagon MD  Cardiologist  [facility]      PLEASE NOTE: This dictation was created using voice recognition software.

## 2019-10-16 RX ORDER — FINASTERIDE 5 MG/1
5 TABLET, FILM COATED ORAL DAILY
Qty: 90 TAB | Refills: 0 | Status: SHIPPED | OUTPATIENT
Start: 2019-10-16 | End: 2019-10-17 | Stop reason: SDUPTHER

## 2019-10-16 NOTE — TELEPHONE ENCOUNTER
Was the patient seen in the last year in this department? Yes LOV 03/19/19     Does patient have an active prescription for medications requested? No     Received Request Via: Pharmacy

## 2019-10-17 RX ORDER — FINASTERIDE 5 MG/1
5 TABLET, FILM COATED ORAL DAILY
Qty: 90 TAB | Refills: 1 | Status: SHIPPED | OUTPATIENT
Start: 2019-10-17 | End: 2019-11-12 | Stop reason: SDUPTHER

## 2019-10-17 NOTE — TELEPHONE ENCOUNTER
Requested Prescriptions     Pending Prescriptions Disp Refills   • finasteride (PROSCAR) 5 MG Tab 90 Tab 0     Sig: Take 1 Tab by mouth every day.   Negra García M.D.

## 2019-10-17 NOTE — TELEPHONE ENCOUNTER
Was the patient seen in the last year in this department? Yes lov 3/19/19    Does patient have an active prescription for medications requested? No     Received Request Via: Patient

## 2019-10-17 NOTE — TELEPHONE ENCOUNTER
VOICEMAIL  1. Caller Name: mindy zambrano                      Call Back Number: [phone] (home)       2. Message: Pt wife is calling to see if rx finasteride 5mg could just be refilled instead of being seen today 10/17/19 at 3:20. Dr. Roblero from [place] prescribed this rx and is moving his practice to [place], Dr. Roblero put the request in prior to leaving but pharm has never gotten the refill request.  Per pt wife     3. Patient approves office to leave a detailed voicemail/MyChart message: yes

## 2019-11-12 ENCOUNTER — OFFICE VISIT (OUTPATIENT)
Dept: MEDICAL GROUP | Facility: PHYSICIAN GROUP | Age: 69
End: 2019-11-12
Payer: MEDICARE

## 2019-11-12 VITALS
OXYGEN SATURATION: 95 % | RESPIRATION RATE: 16 BRPM | WEIGHT: 196 LBS | BODY MASS INDEX: 28.06 KG/M2 | HEIGHT: 70 IN | DIASTOLIC BLOOD PRESSURE: 72 MMHG | SYSTOLIC BLOOD PRESSURE: 136 MMHG | HEART RATE: 66 BPM | TEMPERATURE: 97.2 F

## 2019-11-12 DIAGNOSIS — I10 ESSENTIAL HYPERTENSION: ICD-10-CM

## 2019-11-12 DIAGNOSIS — Z12.5 PROSTATE CANCER SCREENING: ICD-10-CM

## 2019-11-12 DIAGNOSIS — E55.9 VITAMIN D DEFICIENCY DISEASE: ICD-10-CM

## 2019-11-12 DIAGNOSIS — I25.10 CORONARY ARTERY DISEASE INVOLVING NATIVE CORONARY ARTERY OF NATIVE HEART WITHOUT ANGINA PECTORIS: ICD-10-CM

## 2019-11-12 DIAGNOSIS — N52.9 ERECTILE DYSFUNCTION, UNSPECIFIED ERECTILE DYSFUNCTION TYPE: ICD-10-CM

## 2019-11-12 DIAGNOSIS — I25.83 CORONARY ARTERY DISEASE DUE TO LIPID RICH PLAQUE: ICD-10-CM

## 2019-11-12 DIAGNOSIS — I25.10 CORONARY ARTERY DISEASE DUE TO LIPID RICH PLAQUE: ICD-10-CM

## 2019-11-12 DIAGNOSIS — I21.4 NON-ST ELEVATION MYOCARDIAL INFARCTION (NSTEMI) (HCC): ICD-10-CM

## 2019-11-12 DIAGNOSIS — I25.10 CORONARY ARTERY STENOSIS: ICD-10-CM

## 2019-11-12 DIAGNOSIS — Z12.11 COLON CANCER SCREENING: ICD-10-CM

## 2019-11-12 DIAGNOSIS — I25.119 CORONARY ARTERY DISEASE WITH ANGINA PECTORIS, UNSPECIFIED VESSEL OR LESION TYPE, UNSPECIFIED WHETHER NATIVE OR TRANSPLANTED HEART (HCC): ICD-10-CM

## 2019-11-12 DIAGNOSIS — E78.2 MIXED HYPERLIPIDEMIA: ICD-10-CM

## 2019-11-12 DIAGNOSIS — Z20.5 EXPOSURE TO HEPATITIS C: ICD-10-CM

## 2019-11-12 DIAGNOSIS — N40.1 BENIGN NON-NODULAR PROSTATIC HYPERPLASIA WITH LOWER URINARY TRACT SYMPTOMS: ICD-10-CM

## 2019-11-12 DIAGNOSIS — H91.93 DECREASED HEARING OF BOTH EARS: ICD-10-CM

## 2019-11-12 PROCEDURE — 99214 OFFICE O/P EST MOD 30 MIN: CPT | Performed by: FAMILY MEDICINE

## 2019-11-12 RX ORDER — NITROGLYCERIN 0.4 MG/1
0.4 TABLET SUBLINGUAL PRN
Qty: 25 TAB | Refills: 11 | Status: SHIPPED | OUTPATIENT
Start: 2019-11-12

## 2019-11-12 RX ORDER — FINASTERIDE 5 MG/1
5 TABLET, FILM COATED ORAL DAILY
Qty: 90 TAB | Refills: 1 | Status: SHIPPED | OUTPATIENT
Start: 2019-11-12

## 2019-11-12 RX ORDER — SILDENAFIL 100 MG/1
100 TABLET, FILM COATED ORAL PRN
Qty: 16 TAB | Refills: 4 | Status: SHIPPED | OUTPATIENT
Start: 2019-11-12

## 2019-11-12 RX ORDER — TAMSULOSIN HYDROCHLORIDE 0.4 MG/1
0.4 CAPSULE ORAL DAILY
Qty: 90 CAP | Refills: 3
Start: 2019-11-12

## 2019-11-12 RX ORDER — ATORVASTATIN CALCIUM 80 MG/1
TABLET, FILM COATED ORAL
Qty: 90 TAB | Refills: 3 | Status: SHIPPED | OUTPATIENT
Start: 2019-11-12

## 2019-11-13 NOTE — PATIENT INSTRUCTIONS
Patient given written instructions regarding labs,  medications, referrals, dietary and lifestyle management, and return visit.    Callum Cruz MD

## 2019-11-13 NOTE — PROGRESS NOTES
Patient comes in with numerous issues.  He is not having any chest pains or shortness of breath.  He is due for lab work.  He needs to be screened for colon cancer and prostate cancer as well.  He needs refills of some of his medications.  He feels good.  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  He is retired.      Review of Systems   Constitutional: Negative.  Negative for fever, chills, weight loss and malaise/fatigue.   HENT: Negative for hearing loss, ear pain, congestion, sore throat, neck pain and tinnitus.    Eyes: Negative for blurred vision, double vision and pain.   Respiratory: Negative for cough, hemoptysis, shortness of breath and wheezing.    Cardiovascular: Negative for chest pain, palpitations, orthopnea, claudication, leg swelling and PND.   Gastrointestinal: Negative for heartburn, nausea, vomiting, abdominal pain, diarrhea, constipation, blood in stool and melena.   Genitourinary: Negative for incontinence, dysuria, urgency, frequency and hematuria. Male--positive for erectile dysfunction  Musculoskeletal: Negative for myalgias, back pain and joint pain.   Skin: Negative for rash and itching. No lesions that will not heal.  Neurological: Negative for dizziness, tingling, tremors, focal weakness, seizures, loss of consciousness and headaches.   Endo/Heme/Allergies: Negative for environmental allergies and polydipsia.  Does not bruise/bleed easily.   Psychiatric/Behavioral: Negative for depression, memory loss and substance abuse.  The patient is not nervous/anxious and does not have insomnia.  All others negative.     I reviewed the following    Past Medical History:   Diagnosis Date   • Benign prostatic hypertrophy    • Coronary artery stenosis    • Erectile dysfunction    • Hyperlipidemia    • Hypertension    • Hypertrophy of prostate without urinary obstruction and other lower urinary tract symptoms (LUTS)    • Non-ST elevation myocardial infarction (NSTEMI)  (HCC)         Past Surgical History:   Procedure Laterality Date   • OTHER CARDIAC SURGERY  CATH STENT PLACEMENT    Dr Harris  11/2010       Allergies   Allergen Reactions   • Bloodless        Current Outpatient Medications   Medication Sig Dispense Refill   • tamsulosin (FLOMAX) 0.4 MG capsule Take 1 Cap by mouth every day. 90 Cap 3   • finasteride (PROSCAR) 5 MG Tab Take 1 Tab by mouth every day. 90 Tab 1   • atorvastatin (LIPITOR) 80 MG tablet TAKE ONE TABLET BY MOUTH ONE TIME DAILY 90 Tab 3   • metoprolol (LOPRESSOR) 25 MG Tab Take 1 Tab by mouth 2 times a day. 180 Tab 3   • nitroglycerin (NITROSTAT) 0.4 MG SL Tab Place 1 Tab under tongue as needed for Chest Pain. 25 Tab 11   • sildenafil citrate (VIAGRA) 100 MG tablet Take 1 Tab by mouth as needed for Erectile Dysfunction. 16 Tab 4   • aspirin 81 MG tablet Take 1 Tab by mouth every day. 90 Tab 3   • therapeutic multivitamin-minerals (THERAGRAN-M) TABS Take 1 Tab by mouth every day.     • etodolac (LODINE) 400 MG tablet Take 1 Tab by mouth 2 times a day. Take with food (Patient not taking: Reported on 11/12/2019) 30 Tab 3     No current facility-administered medications for this visit.         Family History   Problem Relation Age of Onset   • Hypertension Mother    • Diabetes Father    • Heart Disease Father        Social History     Socioeconomic History   • Marital status:      Spouse name: Not on file   • Number of children: Not on file   • Years of education: Not on file   • Highest education level: Not on file   Occupational History   • Not on file   Social Needs   • Financial resource strain: Not on file   • Food insecurity:     Worry: Not on file     Inability: Not on file   • Transportation needs:     Medical: Not on file     Non-medical: Not on file   Tobacco Use   • Smoking status: Never Smoker   • Smokeless tobacco: Never Used   Substance and Sexual Activity   • Alcohol use: Yes     Comment: moderate   • Drug use: No   • Sexual activity: Yes      Partners: Female     Birth control/protection: Post-Menopausal   Lifestyle   • Physical activity:     Days per week: Not on file     Minutes per session: Not on file   • Stress: Not on file   Relationships   • Social connections:     Talks on phone: Not on file     Gets together: Not on file     Attends Rastafari service: Not on file     Active member of club or organization: Not on file     Attends meetings of clubs or organizations: Not on file     Relationship status: Not on file   • Intimate partner violence:     Fear of current or ex partner: Not on file     Emotionally abused: Not on file     Physically abused: Not on file     Forced sexual activity: Not on file   Other Topics Concern   • Not on file   Social History Narrative   • Not on file        Physical Exam   Nursing note and vitals reviewed.  Constitutional: He is oriented. He appears well-developed and well-nourished. He appears not diaphoretic. No distress.   Head: Normocephalic and atraumatic.   Right Ear: External ear normal. Ear canal and TM normal  Left Ear: External ear normal. Ear canal and TM normal  Nose: Nose normal.   Mouth/Throat: Oropharynx is clear and moist. No oropharyngeal exudate.   Eyes: Conjunctivae and extraocular motions are normal. Pupils are equal, round, and reactive to light. Right eye exhibits no discharge. Left eye exhibits no discharge. No scleral icterus. Fundi benign bilaterally   Neck: Normal range of motion. Neck supple. No JVD present. No tracheal deviation present. No thyromegaly present.   Cardiovascular: Normal rate, regular rhythm, normal heart sounds and intact distal pulses.  Exam reveals no gallop and no friction rub.    No murmur heard.  Pulmonary/Chest: Effort normal and breath sounds normal. No stridor. No respiratory distress. He has no wheezes. He has no rales. He exhibits no tenderness.   Abdominal: Soft. Bowel sounds are normal. He exhibits no distension and no mass. No tenderness. No hepatosplenomegaly.  He has no rebound and no guarding. Hernia confirmed negative in the right inguinal area and confirmed negative in the left inguinal area.   Genitourinary: Penis normal. Rectal exam shows no external hemorrhoid, no internal hemorrhoid, no fissure, no mass, no tenderness and anal tone normal. Guaiac negative stool. Prostate is 2+ enlarged and not tender. There are no prostate masses. Right testis shows no mass, no swelling, no tenderness. Right testis is descended. Left testis shows no mass, no swelling, no tenderness. Left testis is descended. Circumcised. No phimosis, penile erythema or penile tenderness. No discharge found.   Musculoskeletal: Normal range of motion. He exhibits no edema and no tenderness.   Lymphadenopathy:     He has no cervical adenopathy.   No supraclavicular adenopathy.       Right: No inguinal adenopathy present.        Left: No inguinal adenopathy present.   Neurological: He is alert and oriented. He displays normal reflexes. No cranial nerve deficit. He exhibits normal muscle tone. Coordination normal.   Skin: Skin is warm and dry. No rash noted. He is not diaphoretic. No erythema. No pallor.   Psychiatric: He has a normal mood and appropriate affect. His behavior is normal. Judgment and thought content normal.     1. Coronary artery disease due to lipid rich plaque --patient is doing well Comp Metabolic Panel    Lipid Profile   2. Essential hypertension --controlled CBC WITH DIFFERENTIAL    Comp Metabolic Panel    Lipid Profile    URINALYSIS,CULTURE IF INDICATED    metoprolol (LOPRESSOR) 25 MG Tab   3. Mixed hyperlipidemia --needs reassessment Comp Metabolic Panel    Lipid Profile    atorvastatin (LIPITOR) 80 MG tablet   4. Non-ST elevation myocardial infarction (NSTEMI) ([facility]) --patient is doing well atorvastatin (LIPITOR) 80 MG tablet   5. Vitamin D deficiency disease --needs reassessment VITAMIN D 25-HYDROXY   6. Benign non-nodular prostatic hyperplasia with lower urinary tract symptoms  --doing well.  This is stable tamsulosin (FLOMAX) 0.4 MG capsule    finasteride (PROSCAR) 5 MG Tab   7. Decreased hearing of both ears --this is stable    8. Colon cancer screening  OCCULT BLOOD FECES IMMUNOASSAY   9. Prostate cancer screening  PROSTATE SPECIFIC AG SCREENING   10. Coronary artery disease involving native coronary artery of native heart without angina pectoris --doing well Comp Metabolic Panel    Lipid Profile    atorvastatin (LIPITOR) 80 MG tablet    metoprolol (LOPRESSOR) 25 MG Tab   11. Coronary artery stenosis --doing well Comp Metabolic Panel    Lipid Profile    atorvastatin (LIPITOR) 80 MG tablet   12. Coronary artery disease with angina pectoris, unspecified vessel or lesion type, unspecified whether native or transplanted heart (HCC) --doing well no angina at present Comp Metabolic Panel    Lipid Profile    nitroglycerin (NITROSTAT) 0.4 MG SL Tab   13. Erectile dysfunction, unspecified erectile dysfunction type  sildenafil citrate (VIAGRA) 100 MG tablet--refill of this to send to [place].  The patient is advised not to take any nitroglycerin within 48 hours of using Viagra and vice versa.  He understands that he needs to keep 48 hours separation between these 2 medications.   14. Exposure to hepatitis C  HEP C VIRUS ANTIBODY     I spent 50 minutes with this patient of which 28 minutes was involved in answering his questions about coronary artery disease, prostate enlargement, colon cancer screening, prostate cancer screening, erectile dysfunction, hepatitis C, hyperlipidemia, and hypertension control.    Please note that this dictation was created using voice recognition software. I have worked with consultants from the vendor as well as technical experts from [employer] to optimize the interface. I have made every reasonable attempt to correct obvious errors, but I expect that there are errors of grammar and possibly content that I did not discover before finalizing the note.

## 2020-10-14 ENCOUNTER — HOSPITAL ENCOUNTER (OUTPATIENT)
Dept: HOSPITAL 8 - CVU | Age: 70
Discharge: HOME | End: 2020-10-14
Attending: INTERNAL MEDICINE
Payer: MEDICARE

## 2020-10-14 DIAGNOSIS — I65.23: Primary | ICD-10-CM

## 2020-10-14 DIAGNOSIS — I25.10: ICD-10-CM

## 2020-10-14 PROCEDURE — 93880 EXTRACRANIAL BILAT STUDY: CPT

## 2020-11-10 ENCOUNTER — HOSPITAL ENCOUNTER (OUTPATIENT)
Dept: HOSPITAL 8 - CFH | Age: 70
Discharge: HOME | End: 2020-11-10
Attending: INTERNAL MEDICINE
Payer: MEDICARE

## 2020-11-10 DIAGNOSIS — I25.10: Primary | ICD-10-CM

## 2020-11-10 PROCEDURE — 78452 HT MUSCLE IMAGE SPECT MULT: CPT

## 2020-11-10 PROCEDURE — A9502 TC99M TETROFOSMIN: HCPCS

## 2020-11-10 PROCEDURE — 93017 CV STRESS TEST TRACING ONLY: CPT

## 2021-04-20 ENCOUNTER — HOSPITAL ENCOUNTER (INPATIENT)
Dept: HOSPITAL 8 - ED | Age: 71
LOS: 2 days | Discharge: HOME | DRG: 247 | End: 2021-04-22
Attending: INTERNAL MEDICINE | Admitting: INTERNAL MEDICINE
Payer: SELF-PAY

## 2021-04-20 VITALS — DIASTOLIC BLOOD PRESSURE: 91 MMHG | SYSTOLIC BLOOD PRESSURE: 149 MMHG

## 2021-04-20 VITALS — WEIGHT: 210.54 LBS | BODY MASS INDEX: 30.14 KG/M2 | HEIGHT: 70 IN

## 2021-04-20 VITALS — SYSTOLIC BLOOD PRESSURE: 174 MMHG | DIASTOLIC BLOOD PRESSURE: 101 MMHG

## 2021-04-20 VITALS — SYSTOLIC BLOOD PRESSURE: 162 MMHG | DIASTOLIC BLOOD PRESSURE: 95 MMHG

## 2021-04-20 VITALS — DIASTOLIC BLOOD PRESSURE: 101 MMHG | SYSTOLIC BLOOD PRESSURE: 169 MMHG

## 2021-04-20 VITALS — SYSTOLIC BLOOD PRESSURE: 158 MMHG | DIASTOLIC BLOOD PRESSURE: 97 MMHG

## 2021-04-20 VITALS — DIASTOLIC BLOOD PRESSURE: 92 MMHG | SYSTOLIC BLOOD PRESSURE: 164 MMHG

## 2021-04-20 VITALS — SYSTOLIC BLOOD PRESSURE: 149 MMHG | DIASTOLIC BLOOD PRESSURE: 91 MMHG

## 2021-04-20 VITALS — SYSTOLIC BLOOD PRESSURE: 144 MMHG | DIASTOLIC BLOOD PRESSURE: 91 MMHG

## 2021-04-20 VITALS — DIASTOLIC BLOOD PRESSURE: 95 MMHG | SYSTOLIC BLOOD PRESSURE: 152 MMHG

## 2021-04-20 VITALS — DIASTOLIC BLOOD PRESSURE: 91 MMHG | SYSTOLIC BLOOD PRESSURE: 150 MMHG

## 2021-04-20 VITALS — SYSTOLIC BLOOD PRESSURE: 154 MMHG | DIASTOLIC BLOOD PRESSURE: 90 MMHG

## 2021-04-20 VITALS — DIASTOLIC BLOOD PRESSURE: 97 MMHG | SYSTOLIC BLOOD PRESSURE: 151 MMHG

## 2021-04-20 VITALS — DIASTOLIC BLOOD PRESSURE: 93 MMHG | SYSTOLIC BLOOD PRESSURE: 147 MMHG

## 2021-04-20 VITALS — DIASTOLIC BLOOD PRESSURE: 93 MMHG | SYSTOLIC BLOOD PRESSURE: 153 MMHG

## 2021-04-20 VITALS — DIASTOLIC BLOOD PRESSURE: 97 MMHG | SYSTOLIC BLOOD PRESSURE: 163 MMHG

## 2021-04-20 DIAGNOSIS — I10: ICD-10-CM

## 2021-04-20 DIAGNOSIS — I21.09: Primary | ICD-10-CM

## 2021-04-20 DIAGNOSIS — R33.8: ICD-10-CM

## 2021-04-20 DIAGNOSIS — E78.5: ICD-10-CM

## 2021-04-20 DIAGNOSIS — N40.1: ICD-10-CM

## 2021-04-20 DIAGNOSIS — I25.10: ICD-10-CM

## 2021-04-20 DIAGNOSIS — N52.9: ICD-10-CM

## 2021-04-20 DIAGNOSIS — Z20.822: ICD-10-CM

## 2021-04-20 DIAGNOSIS — Z87.891: ICD-10-CM

## 2021-04-20 DIAGNOSIS — Z95.5: ICD-10-CM

## 2021-04-20 LAB
APTT BLD: 25 SECONDS (ref 25–31)
BASOPHILS # BLD AUTO: 0 X10^3/UL (ref 0–0.1)
BASOPHILS NFR BLD AUTO: 1 % (ref 0–1)
EOSINOPHIL # BLD AUTO: 0.1 X10^3/UL (ref 0–0.4)
EOSINOPHIL NFR BLD AUTO: 1 % (ref 1–7)
ERYTHROCYTE [DISTWIDTH] IN BLOOD BY AUTOMATED COUNT: 14 % (ref 9.4–14.8)
INR PPP: 1.1 (ref 0.93–1.1)
LYMPHOCYTES # BLD AUTO: 1.4 X10^3/UL (ref 1–3.4)
LYMPHOCYTES NFR BLD AUTO: 21 % (ref 22–44)
MCH RBC QN AUTO: 30.6 PG (ref 27.5–34.5)
MCHC RBC AUTO-ENTMCNC: 33.9 G/DL (ref 33.2–36.2)
MD: NO
MONOCYTES # BLD AUTO: 0.4 X10^3/UL (ref 0.2–0.8)
MONOCYTES NFR BLD AUTO: 7 % (ref 2–9)
NEUTROPHILS # BLD AUTO: 4.8 X10^3/UL (ref 1.8–6.8)
NEUTROPHILS NFR BLD AUTO: 71 % (ref 42–75)
PLATELET # BLD AUTO: 147 X10^3/UL (ref 130–400)
PMV BLD AUTO: 10.1 FL (ref 7.4–10.4)
PROTHROMBIN TIME: 11.8 SECONDS (ref 9.6–11.5)
RBC # BLD AUTO: 5.18 X10^6/UL (ref 4.38–5.82)
TROPONIN I SERPL-MCNC: 0.04 NG/ML (ref 0–0.04)
TROPONIN I SERPL-MCNC: 19.7 NG/ML (ref 0–0.04)
TROPONIN I SERPL-MCNC: 7.55 NG/ML (ref 0–0.04)

## 2021-04-20 PROCEDURE — 80048 BASIC METABOLIC PNL TOTAL CA: CPT

## 2021-04-20 PROCEDURE — 93458 L HRT ARTERY/VENTRICLE ANGIO: CPT

## 2021-04-20 PROCEDURE — 87635 SARS-COV-2 COVID-19 AMP PRB: CPT

## 2021-04-20 PROCEDURE — C1725 CATH, TRANSLUMIN NON-LASER: HCPCS

## 2021-04-20 PROCEDURE — 36415 COLL VENOUS BLD VENIPUNCTURE: CPT

## 2021-04-20 PROCEDURE — B2151ZZ FLUOROSCOPY OF LEFT HEART USING LOW OSMOLAR CONTRAST: ICD-10-PCS | Performed by: INTERNAL MEDICINE

## 2021-04-20 PROCEDURE — 80047 BASIC METABLC PNL IONIZED CA: CPT

## 2021-04-20 PROCEDURE — C1760 CLOSURE DEV, VASC: HCPCS

## 2021-04-20 PROCEDURE — 84484 ASSAY OF TROPONIN QUANT: CPT

## 2021-04-20 PROCEDURE — 93005 ELECTROCARDIOGRAM TRACING: CPT

## 2021-04-20 PROCEDURE — 93356 MYOCRD STRAIN IMG SPCKL TRCK: CPT

## 2021-04-20 PROCEDURE — 99285 EMERGENCY DEPT VISIT HI MDM: CPT

## 2021-04-20 PROCEDURE — 4A023N7 MEASUREMENT OF CARDIAC SAMPLING AND PRESSURE, LEFT HEART, PERCUTANEOUS APPROACH: ICD-10-PCS | Performed by: INTERNAL MEDICINE

## 2021-04-20 PROCEDURE — 93306 TTE W/DOPPLER COMPLETE: CPT

## 2021-04-20 PROCEDURE — B2111ZZ FLUOROSCOPY OF MULTIPLE CORONARY ARTERIES USING LOW OSMOLAR CONTRAST: ICD-10-PCS | Performed by: INTERNAL MEDICINE

## 2021-04-20 PROCEDURE — C1769 GUIDE WIRE: HCPCS

## 2021-04-20 PROCEDURE — 99157 MOD SED OTHER PHYS/QHP EA: CPT

## 2021-04-20 PROCEDURE — C1874 STENT, COATED/COV W/DEL SYS: HCPCS

## 2021-04-20 PROCEDURE — C1894 INTRO/SHEATH, NON-LASER: HCPCS

## 2021-04-20 PROCEDURE — 85025 COMPLETE CBC W/AUTO DIFF WBC: CPT

## 2021-04-20 PROCEDURE — 99156 MOD SED OTH PHYS/QHP 5/>YRS: CPT

## 2021-04-20 PROCEDURE — 87081 CULTURE SCREEN ONLY: CPT

## 2021-04-20 PROCEDURE — C1887 CATHETER, GUIDING: HCPCS

## 2021-04-20 PROCEDURE — 71045 X-RAY EXAM CHEST 1 VIEW: CPT

## 2021-04-20 PROCEDURE — 85730 THROMBOPLASTIN TIME PARTIAL: CPT

## 2021-04-20 PROCEDURE — 85610 PROTHROMBIN TIME: CPT

## 2021-04-20 PROCEDURE — 027034Z DILATION OF CORONARY ARTERY, ONE ARTERY WITH DRUG-ELUTING INTRALUMINAL DEVICE, PERCUTANEOUS APPROACH: ICD-10-PCS | Performed by: INTERNAL MEDICINE

## 2021-04-20 RX ADMIN — TICAGRELOR SCH MG: 90 TABLET ORAL at 21:15

## 2021-04-20 RX ADMIN — TAMSULOSIN HYDROCHLORIDE SCH MG: 0.4 CAPSULE ORAL at 21:15

## 2021-04-20 RX ADMIN — SODIUM CHLORIDE SCH MLS/HR: 0.9 INJECTION, SOLUTION INTRAVENOUS at 22:06

## 2021-04-20 RX ADMIN — ATORVASTATIN CALCIUM SCH MG: 80 TABLET, FILM COATED ORAL at 21:15

## 2021-04-20 RX ADMIN — SODIUM CHLORIDE SCH MLS/HR: 0.9 INJECTION, SOLUTION INTRAVENOUS at 15:05

## 2021-04-20 NOTE — NUR
BIB CARE FLIGHT FROM [facility] C/O STERNAL CHEST PAIN, SOB/DIAPHORESIS, ONSET 
1115 TODAY, NTG GTT @20MCG/MIN INFUSING ON ARRIVAL, GIVEN FENTANYL, ZOFRAN & 
ASA PTA; PT C/O PAIN BUT "BETTER THAN BEFORE"; CODE TEAM (DR FABIAN, DR BRISENO, LAB, EKG, PHARMACY) AT BS ON PT ARRIVAL.

## 2021-04-21 VITALS — SYSTOLIC BLOOD PRESSURE: 145 MMHG | DIASTOLIC BLOOD PRESSURE: 78 MMHG

## 2021-04-21 LAB
ANION GAP SERPL CALC-SCNC: 8 MMOL/L (ref 5–15)
CALCIUM SERPL-MCNC: 8.9 MG/DL (ref 8.5–10.1)
CHLORIDE SERPL-SCNC: 111 MMOL/L (ref 98–107)
CREAT SERPL-MCNC: 0.79 MG/DL (ref 0.7–1.3)
TROPONIN I SERPL-MCNC: 18.6 NG/ML (ref 0–0.04)

## 2021-04-21 RX ADMIN — LOSARTAN POTASSIUM SCH MG: 25 TABLET, FILM COATED ORAL at 09:57

## 2021-04-21 RX ADMIN — ATORVASTATIN CALCIUM SCH MG: 80 TABLET, FILM COATED ORAL at 21:07

## 2021-04-21 RX ADMIN — TICAGRELOR SCH MG: 90 TABLET ORAL at 21:07

## 2021-04-21 RX ADMIN — TICAGRELOR SCH MG: 90 TABLET ORAL at 08:26

## 2021-04-21 RX ADMIN — TAMSULOSIN HYDROCHLORIDE SCH MG: 0.4 CAPSULE ORAL at 21:07

## 2021-04-21 RX ADMIN — SODIUM CHLORIDE SCH MLS/HR: 0.9 INJECTION, SOLUTION INTRAVENOUS at 05:54

## 2021-04-21 RX ADMIN — FINASTERIDE SCH MG: 5 TABLET, FILM COATED ORAL at 07:30

## 2021-04-21 RX ADMIN — ASPIRIN 81 MG SCH MG: 81 TABLET ORAL at 09:57

## 2021-04-22 VITALS — SYSTOLIC BLOOD PRESSURE: 111 MMHG | DIASTOLIC BLOOD PRESSURE: 79 MMHG

## 2021-04-22 VITALS — DIASTOLIC BLOOD PRESSURE: 73 MMHG | SYSTOLIC BLOOD PRESSURE: 110 MMHG

## 2021-04-22 RX ADMIN — FINASTERIDE SCH MG: 5 TABLET, FILM COATED ORAL at 08:29

## 2021-04-22 RX ADMIN — ASPIRIN 81 MG SCH MG: 81 TABLET ORAL at 06:15

## 2021-04-22 RX ADMIN — LOSARTAN POTASSIUM SCH MG: 25 TABLET, FILM COATED ORAL at 08:30

## 2021-04-22 RX ADMIN — TICAGRELOR SCH MG: 90 TABLET ORAL at 08:30

## 2025-05-13 ENCOUNTER — HOSPITAL ENCOUNTER (OUTPATIENT)
Dept: RADIOLOGY | Facility: MEDICAL CENTER | Age: 75
End: 2025-05-13
Attending: INTERNAL MEDICINE
Payer: MEDICARE

## 2025-05-13 DIAGNOSIS — R07.89 OTHER CHEST PAIN: ICD-10-CM

## 2025-05-13 DIAGNOSIS — I10 ESSENTIAL HYPERTENSION, MALIGNANT: ICD-10-CM

## 2025-05-13 PROCEDURE — 93880 EXTRACRANIAL BILAT STUDY: CPT

## 2025-06-18 ENCOUNTER — OFFICE VISIT (OUTPATIENT)
Dept: URGENT CARE | Facility: PHYSICIAN GROUP | Age: 75
End: 2025-06-18
Payer: MEDICARE

## 2025-06-18 VITALS
WEIGHT: 192 LBS | OXYGEN SATURATION: 94 % | RESPIRATION RATE: 13 BRPM | HEIGHT: 70 IN | SYSTOLIC BLOOD PRESSURE: 108 MMHG | HEART RATE: 78 BPM | BODY MASS INDEX: 27.49 KG/M2 | DIASTOLIC BLOOD PRESSURE: 64 MMHG | TEMPERATURE: 99 F

## 2025-06-18 DIAGNOSIS — L03.011 CELLULITIS OF FINGER OF RIGHT HAND: ICD-10-CM
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
## 2025-06-18 PROCEDURE — 99214 OFFICE O/P EST MOD 30 MIN: CPT

## 2025-06-18 ASSESSMENT — ENCOUNTER SYMPTOMS
DIAPHORESIS: 0
HEADACHES: 0
DIZZINESS: 0
DIARRHEA: 0
SENSORY CHANGE: 0
ABDOMINAL PAIN: 0
FEVER: 0
CHILLS: 0
MYALGIAS: 0
NAUSEA: 0
WEAKNESS: 0
SHORTNESS OF BREATH: 0
VOMITING: 0